# Patient Record
Sex: FEMALE | URBAN - METROPOLITAN AREA
[De-identification: names, ages, dates, MRNs, and addresses within clinical notes are randomized per-mention and may not be internally consistent; named-entity substitution may affect disease eponyms.]

---

## 2021-09-13 ENCOUNTER — LAB VISIT (OUTPATIENT)
Dept: LAB | Facility: OTHER | Age: 86
End: 2021-09-13
Payer: OTHER GOVERNMENT

## 2021-09-13 DIAGNOSIS — Z20.822 ENCOUNTER FOR LABORATORY TESTING FOR COVID-19 VIRUS: ICD-10-CM

## 2021-09-13 PROCEDURE — U0003 INFECTIOUS AGENT DETECTION BY NUCLEIC ACID (DNA OR RNA); SEVERE ACUTE RESPIRATORY SYNDROME CORONAVIRUS 2 (SARS-COV-2) (CORONAVIRUS DISEASE [COVID-19]), AMPLIFIED PROBE TECHNIQUE, MAKING USE OF HIGH THROUGHPUT TECHNOLOGIES AS DESCRIBED BY CMS-2020-01-R: HCPCS | Performed by: NURSE PRACTITIONER

## 2021-09-14 LAB
SARS-COV-2 RNA RESP QL NAA+PROBE: NOT DETECTED
SARS-COV-2- CYCLE NUMBER: NORMAL

## 2021-09-20 ENCOUNTER — LAB VISIT (OUTPATIENT)
Dept: LAB | Facility: OTHER | Age: 86
End: 2021-09-20
Payer: OTHER GOVERNMENT

## 2021-09-20 DIAGNOSIS — Z20.822 ENCOUNTER FOR LABORATORY TESTING FOR COVID-19 VIRUS: ICD-10-CM

## 2021-09-20 PROCEDURE — U0003 INFECTIOUS AGENT DETECTION BY NUCLEIC ACID (DNA OR RNA); SEVERE ACUTE RESPIRATORY SYNDROME CORONAVIRUS 2 (SARS-COV-2) (CORONAVIRUS DISEASE [COVID-19]), AMPLIFIED PROBE TECHNIQUE, MAKING USE OF HIGH THROUGHPUT TECHNOLOGIES AS DESCRIBED BY CMS-2020-01-R: HCPCS | Performed by: NURSE PRACTITIONER

## 2021-09-21 LAB
SARS-COV-2 RNA RESP QL NAA+PROBE: NOT DETECTED
SARS-COV-2- CYCLE NUMBER: NORMAL

## 2021-09-27 ENCOUNTER — LAB VISIT (OUTPATIENT)
Dept: LAB | Facility: OTHER | Age: 86
End: 2021-09-27
Payer: OTHER GOVERNMENT

## 2021-09-27 DIAGNOSIS — Z20.822 ENCOUNTER FOR LABORATORY TESTING FOR COVID-19 VIRUS: ICD-10-CM

## 2021-09-27 PROCEDURE — U0003 INFECTIOUS AGENT DETECTION BY NUCLEIC ACID (DNA OR RNA); SEVERE ACUTE RESPIRATORY SYNDROME CORONAVIRUS 2 (SARS-COV-2) (CORONAVIRUS DISEASE [COVID-19]), AMPLIFIED PROBE TECHNIQUE, MAKING USE OF HIGH THROUGHPUT TECHNOLOGIES AS DESCRIBED BY CMS-2020-01-R: HCPCS | Performed by: NURSE PRACTITIONER

## 2021-09-28 LAB
SARS-COV-2 RNA RESP QL NAA+PROBE: NOT DETECTED
SARS-COV-2- CYCLE NUMBER: NORMAL

## 2021-10-04 ENCOUNTER — LAB VISIT (OUTPATIENT)
Dept: LAB | Facility: OTHER | Age: 86
End: 2021-10-04
Payer: OTHER GOVERNMENT

## 2021-10-04 DIAGNOSIS — Z20.822 ENCOUNTER FOR LABORATORY TESTING FOR COVID-19 VIRUS: ICD-10-CM

## 2021-10-04 PROCEDURE — U0003 INFECTIOUS AGENT DETECTION BY NUCLEIC ACID (DNA OR RNA); SEVERE ACUTE RESPIRATORY SYNDROME CORONAVIRUS 2 (SARS-COV-2) (CORONAVIRUS DISEASE [COVID-19]), AMPLIFIED PROBE TECHNIQUE, MAKING USE OF HIGH THROUGHPUT TECHNOLOGIES AS DESCRIBED BY CMS-2020-01-R: HCPCS | Performed by: NURSE PRACTITIONER

## 2021-10-05 LAB
SARS-COV-2 RNA RESP QL NAA+PROBE: NOT DETECTED
SARS-COV-2- CYCLE NUMBER: NORMAL

## 2021-10-11 ENCOUNTER — LAB VISIT (OUTPATIENT)
Dept: LAB | Facility: OTHER | Age: 86
End: 2021-10-11
Payer: OTHER GOVERNMENT

## 2021-10-11 DIAGNOSIS — Z20.822 ENCOUNTER FOR LABORATORY TESTING FOR COVID-19 VIRUS: ICD-10-CM

## 2021-10-11 PROCEDURE — U0003 INFECTIOUS AGENT DETECTION BY NUCLEIC ACID (DNA OR RNA); SEVERE ACUTE RESPIRATORY SYNDROME CORONAVIRUS 2 (SARS-COV-2) (CORONAVIRUS DISEASE [COVID-19]), AMPLIFIED PROBE TECHNIQUE, MAKING USE OF HIGH THROUGHPUT TECHNOLOGIES AS DESCRIBED BY CMS-2020-01-R: HCPCS | Performed by: NURSE PRACTITIONER

## 2021-10-12 LAB
SARS-COV-2 RNA RESP QL NAA+PROBE: NOT DETECTED
SARS-COV-2- CYCLE NUMBER: NORMAL

## 2021-10-18 ENCOUNTER — LAB VISIT (OUTPATIENT)
Dept: LAB | Facility: OTHER | Age: 86
End: 2021-10-18
Payer: OTHER GOVERNMENT

## 2021-10-18 DIAGNOSIS — Z20.822 ENCOUNTER FOR LABORATORY TESTING FOR COVID-19 VIRUS: ICD-10-CM

## 2021-10-18 PROCEDURE — U0003 INFECTIOUS AGENT DETECTION BY NUCLEIC ACID (DNA OR RNA); SEVERE ACUTE RESPIRATORY SYNDROME CORONAVIRUS 2 (SARS-COV-2) (CORONAVIRUS DISEASE [COVID-19]), AMPLIFIED PROBE TECHNIQUE, MAKING USE OF HIGH THROUGHPUT TECHNOLOGIES AS DESCRIBED BY CMS-2020-01-R: HCPCS | Performed by: NURSE PRACTITIONER

## 2021-10-19 LAB
SARS-COV-2 RNA RESP QL NAA+PROBE: NOT DETECTED
SARS-COV-2- CYCLE NUMBER: NORMAL

## 2021-10-25 ENCOUNTER — LAB VISIT (OUTPATIENT)
Dept: LAB | Facility: OTHER | Age: 86
End: 2021-10-25
Payer: OTHER GOVERNMENT

## 2021-10-25 DIAGNOSIS — Z20.822 ENCOUNTER FOR LABORATORY TESTING FOR COVID-19 VIRUS: ICD-10-CM

## 2021-10-25 PROCEDURE — U0003 INFECTIOUS AGENT DETECTION BY NUCLEIC ACID (DNA OR RNA); SEVERE ACUTE RESPIRATORY SYNDROME CORONAVIRUS 2 (SARS-COV-2) (CORONAVIRUS DISEASE [COVID-19]), AMPLIFIED PROBE TECHNIQUE, MAKING USE OF HIGH THROUGHPUT TECHNOLOGIES AS DESCRIBED BY CMS-2020-01-R: HCPCS | Performed by: NURSE PRACTITIONER

## 2021-10-26 LAB
SARS-COV-2 RNA RESP QL NAA+PROBE: NOT DETECTED
SARS-COV-2- CYCLE NUMBER: NORMAL

## 2021-11-08 ENCOUNTER — LAB VISIT (OUTPATIENT)
Dept: LAB | Facility: OTHER | Age: 86
End: 2021-11-08
Payer: OTHER GOVERNMENT

## 2021-11-08 DIAGNOSIS — Z20.822 ENCOUNTER FOR LABORATORY TESTING FOR COVID-19 VIRUS: ICD-10-CM

## 2021-11-08 PROCEDURE — U0003 INFECTIOUS AGENT DETECTION BY NUCLEIC ACID (DNA OR RNA); SEVERE ACUTE RESPIRATORY SYNDROME CORONAVIRUS 2 (SARS-COV-2) (CORONAVIRUS DISEASE [COVID-19]), AMPLIFIED PROBE TECHNIQUE, MAKING USE OF HIGH THROUGHPUT TECHNOLOGIES AS DESCRIBED BY CMS-2020-01-R: HCPCS | Performed by: NURSE PRACTITIONER

## 2021-11-09 LAB
SARS-COV-2 RNA RESP QL NAA+PROBE: NOT DETECTED
SARS-COV-2- CYCLE NUMBER: NORMAL

## 2021-11-15 ENCOUNTER — LAB VISIT (OUTPATIENT)
Dept: LAB | Facility: OTHER | Age: 86
End: 2021-11-15
Payer: OTHER GOVERNMENT

## 2021-11-15 DIAGNOSIS — Z20.822 ENCOUNTER FOR LABORATORY TESTING FOR COVID-19 VIRUS: ICD-10-CM

## 2021-11-15 PROCEDURE — U0003 INFECTIOUS AGENT DETECTION BY NUCLEIC ACID (DNA OR RNA); SEVERE ACUTE RESPIRATORY SYNDROME CORONAVIRUS 2 (SARS-COV-2) (CORONAVIRUS DISEASE [COVID-19]), AMPLIFIED PROBE TECHNIQUE, MAKING USE OF HIGH THROUGHPUT TECHNOLOGIES AS DESCRIBED BY CMS-2020-01-R: HCPCS | Performed by: NURSE PRACTITIONER

## 2021-11-16 LAB
SARS-COV-2 RNA RESP QL NAA+PROBE: NOT DETECTED
SARS-COV-2- CYCLE NUMBER: NORMAL

## 2021-11-22 ENCOUNTER — LAB VISIT (OUTPATIENT)
Dept: LAB | Facility: OTHER | Age: 86
End: 2021-11-22
Payer: OTHER GOVERNMENT

## 2021-11-22 DIAGNOSIS — Z20.822 ENCOUNTER FOR LABORATORY TESTING FOR COVID-19 VIRUS: ICD-10-CM

## 2021-11-22 PROCEDURE — U0003 INFECTIOUS AGENT DETECTION BY NUCLEIC ACID (DNA OR RNA); SEVERE ACUTE RESPIRATORY SYNDROME CORONAVIRUS 2 (SARS-COV-2) (CORONAVIRUS DISEASE [COVID-19]), AMPLIFIED PROBE TECHNIQUE, MAKING USE OF HIGH THROUGHPUT TECHNOLOGIES AS DESCRIBED BY CMS-2020-01-R: HCPCS | Performed by: NURSE PRACTITIONER

## 2021-11-23 ENCOUNTER — OFFICE VISIT (OUTPATIENT)
Dept: PRIMARY CARE CLINIC | Facility: CLINIC | Age: 86
End: 2021-11-23
Payer: MEDICARE

## 2021-11-23 VITALS
RESPIRATION RATE: 16 BRPM | HEIGHT: 60 IN | WEIGHT: 102.19 LBS | HEART RATE: 72 BPM | OXYGEN SATURATION: 96 % | SYSTOLIC BLOOD PRESSURE: 128 MMHG | DIASTOLIC BLOOD PRESSURE: 52 MMHG | BODY MASS INDEX: 20.06 KG/M2 | TEMPERATURE: 99 F

## 2021-11-23 DIAGNOSIS — Z11.59 NEED FOR HEPATITIS C SCREENING TEST: ICD-10-CM

## 2021-11-23 DIAGNOSIS — Z76.89 ENCOUNTER TO ESTABLISH CARE: ICD-10-CM

## 2021-11-23 DIAGNOSIS — Z11.4 ENCOUNTER FOR SCREENING FOR HIV: ICD-10-CM

## 2021-11-23 DIAGNOSIS — Z87.898 HISTORY OF PREDIABETES: ICD-10-CM

## 2021-11-23 DIAGNOSIS — G47.00 INSOMNIA, UNSPECIFIED TYPE: ICD-10-CM

## 2021-11-23 DIAGNOSIS — E16.1 OTHER HYPOGLYCEMIA: ICD-10-CM

## 2021-11-23 DIAGNOSIS — E46 UNSPECIFIED PROTEIN-CALORIE MALNUTRITION: ICD-10-CM

## 2021-11-23 DIAGNOSIS — K64.9 HEMORRHOIDS, UNSPECIFIED HEMORRHOID TYPE: Chronic | ICD-10-CM

## 2021-11-23 DIAGNOSIS — F41.1 GAD (GENERALIZED ANXIETY DISORDER): Chronic | ICD-10-CM

## 2021-11-23 DIAGNOSIS — I10 HYPERTENSION, UNSPECIFIED TYPE: Primary | ICD-10-CM

## 2021-11-23 DIAGNOSIS — Z13.6 SCREENING FOR CARDIOVASCULAR CONDITION: ICD-10-CM

## 2021-11-23 DIAGNOSIS — N20.0 KIDNEY STONES: ICD-10-CM

## 2021-11-23 LAB
SARS-COV-2 RNA RESP QL NAA+PROBE: NOT DETECTED
SARS-COV-2- CYCLE NUMBER: NORMAL

## 2021-11-23 PROCEDURE — 99204 PR OFFICE/OUTPT VISIT, NEW, LEVL IV, 45-59 MIN: ICD-10-PCS | Mod: S$PBB,,, | Performed by: STUDENT IN AN ORGANIZED HEALTH CARE EDUCATION/TRAINING PROGRAM

## 2021-11-23 PROCEDURE — 99204 OFFICE O/P NEW MOD 45 MIN: CPT | Mod: S$PBB,,, | Performed by: STUDENT IN AN ORGANIZED HEALTH CARE EDUCATION/TRAINING PROGRAM

## 2021-11-23 PROCEDURE — 93010 EKG 12-LEAD: ICD-10-PCS | Mod: S$PBB,,, | Performed by: INTERNAL MEDICINE

## 2021-11-23 PROCEDURE — 93010 ELECTROCARDIOGRAM REPORT: CPT | Mod: S$PBB,,, | Performed by: INTERNAL MEDICINE

## 2021-11-23 PROCEDURE — 99999 PR PBB SHADOW E&M-NEW PATIENT-LVL IV: CPT | Mod: PBBFAC,,, | Performed by: STUDENT IN AN ORGANIZED HEALTH CARE EDUCATION/TRAINING PROGRAM

## 2021-11-23 PROCEDURE — 93005 ELECTROCARDIOGRAM TRACING: CPT | Mod: PBBFAC,PN | Performed by: INTERNAL MEDICINE

## 2021-11-23 PROCEDURE — 99999 PR PBB SHADOW E&M-NEW PATIENT-LVL IV: ICD-10-PCS | Mod: PBBFAC,,, | Performed by: STUDENT IN AN ORGANIZED HEALTH CARE EDUCATION/TRAINING PROGRAM

## 2021-11-23 PROCEDURE — 99204 OFFICE O/P NEW MOD 45 MIN: CPT | Mod: PBBFAC,PN | Performed by: STUDENT IN AN ORGANIZED HEALTH CARE EDUCATION/TRAINING PROGRAM

## 2021-11-23 RX ORDER — IRBESARTAN 300 MG/1
1 TABLET ORAL EVERY MORNING
COMMUNITY
Start: 2021-10-05 | End: 2022-04-22 | Stop reason: SDUPTHER

## 2021-11-23 RX ORDER — NIFEDIPINE 30 MG/1
1 TABLET, EXTENDED RELEASE ORAL DAILY
COMMUNITY
Start: 2021-10-05 | End: 2021-11-23

## 2021-11-23 RX ORDER — TRAZODONE HYDROCHLORIDE 50 MG/1
50 TABLET ORAL NIGHTLY
Qty: 30 TABLET | Refills: 11 | Status: SHIPPED | OUTPATIENT
Start: 2021-11-23 | End: 2022-05-09 | Stop reason: SDUPTHER

## 2021-11-23 RX ORDER — AMLODIPINE BESYLATE 5 MG/1
5 TABLET ORAL DAILY
Qty: 90 TABLET | Refills: 3 | Status: SHIPPED | OUTPATIENT
Start: 2021-11-23 | End: 2022-05-09 | Stop reason: SDUPTHER

## 2021-11-23 RX ORDER — ACETAMINOPHEN 500 MG
5 TABLET ORAL NIGHTLY
Qty: 90 TABLET | Refills: 3 | Status: SHIPPED | OUTPATIENT
Start: 2021-11-23 | End: 2022-05-09

## 2021-11-23 RX ORDER — ALPRAZOLAM 0.5 MG/1
1 TABLET ORAL NIGHTLY
COMMUNITY
Start: 2021-11-17 | End: 2022-05-09

## 2021-11-23 RX ORDER — IBUPROFEN 200 MG
200 TABLET ORAL EVERY 6 HOURS PRN
COMMUNITY

## 2021-11-23 RX ORDER — POLYETHYLENE GLYCOL 3350 17 G/17G
17 POWDER, FOR SOLUTION ORAL DAILY
COMMUNITY

## 2021-11-23 RX ORDER — SENNOSIDES 25 MG/1
TABLET, FILM COATED ORAL
COMMUNITY
End: 2022-10-27

## 2021-11-29 ENCOUNTER — LAB VISIT (OUTPATIENT)
Dept: LAB | Facility: OTHER | Age: 86
End: 2021-11-29
Payer: OTHER GOVERNMENT

## 2021-11-29 DIAGNOSIS — Z20.822 ENCOUNTER FOR LABORATORY TESTING FOR COVID-19 VIRUS: ICD-10-CM

## 2021-11-29 PROCEDURE — U0003 INFECTIOUS AGENT DETECTION BY NUCLEIC ACID (DNA OR RNA); SEVERE ACUTE RESPIRATORY SYNDROME CORONAVIRUS 2 (SARS-COV-2) (CORONAVIRUS DISEASE [COVID-19]), AMPLIFIED PROBE TECHNIQUE, MAKING USE OF HIGH THROUGHPUT TECHNOLOGIES AS DESCRIBED BY CMS-2020-01-R: HCPCS | Performed by: NURSE PRACTITIONER

## 2021-11-30 LAB
SARS-COV-2 RNA RESP QL NAA+PROBE: NOT DETECTED
SARS-COV-2- CYCLE NUMBER: NORMAL

## 2021-12-06 ENCOUNTER — LAB VISIT (OUTPATIENT)
Dept: LAB | Facility: OTHER | Age: 86
End: 2021-12-06
Payer: OTHER GOVERNMENT

## 2021-12-06 DIAGNOSIS — Z20.822 ENCOUNTER FOR LABORATORY TESTING FOR COVID-19 VIRUS: ICD-10-CM

## 2021-12-06 PROCEDURE — U0003 INFECTIOUS AGENT DETECTION BY NUCLEIC ACID (DNA OR RNA); SEVERE ACUTE RESPIRATORY SYNDROME CORONAVIRUS 2 (SARS-COV-2) (CORONAVIRUS DISEASE [COVID-19]), AMPLIFIED PROBE TECHNIQUE, MAKING USE OF HIGH THROUGHPUT TECHNOLOGIES AS DESCRIBED BY CMS-2020-01-R: HCPCS | Performed by: NURSE PRACTITIONER

## 2021-12-07 LAB
SARS-COV-2 RNA RESP QL NAA+PROBE: NOT DETECTED
SARS-COV-2- CYCLE NUMBER: NORMAL

## 2021-12-13 ENCOUNTER — LAB VISIT (OUTPATIENT)
Dept: LAB | Facility: OTHER | Age: 86
End: 2021-12-13
Payer: OTHER GOVERNMENT

## 2021-12-13 DIAGNOSIS — Z20.822 ENCOUNTER FOR LABORATORY TESTING FOR COVID-19 VIRUS: ICD-10-CM

## 2021-12-13 PROCEDURE — U0003 INFECTIOUS AGENT DETECTION BY NUCLEIC ACID (DNA OR RNA); SEVERE ACUTE RESPIRATORY SYNDROME CORONAVIRUS 2 (SARS-COV-2) (CORONAVIRUS DISEASE [COVID-19]), AMPLIFIED PROBE TECHNIQUE, MAKING USE OF HIGH THROUGHPUT TECHNOLOGIES AS DESCRIBED BY CMS-2020-01-R: HCPCS | Performed by: NURSE PRACTITIONER

## 2021-12-14 LAB
SARS-COV-2 RNA RESP QL NAA+PROBE: NOT DETECTED
SARS-COV-2- CYCLE NUMBER: NORMAL

## 2021-12-20 ENCOUNTER — LAB VISIT (OUTPATIENT)
Dept: LAB | Facility: OTHER | Age: 86
End: 2021-12-20
Payer: OTHER GOVERNMENT

## 2021-12-20 DIAGNOSIS — Z20.822 ENCOUNTER FOR LABORATORY TESTING FOR COVID-19 VIRUS: ICD-10-CM

## 2021-12-20 PROCEDURE — U0003 INFECTIOUS AGENT DETECTION BY NUCLEIC ACID (DNA OR RNA); SEVERE ACUTE RESPIRATORY SYNDROME CORONAVIRUS 2 (SARS-COV-2) (CORONAVIRUS DISEASE [COVID-19]), AMPLIFIED PROBE TECHNIQUE, MAKING USE OF HIGH THROUGHPUT TECHNOLOGIES AS DESCRIBED BY CMS-2020-01-R: HCPCS | Performed by: NURSE PRACTITIONER

## 2021-12-21 LAB
SARS-COV-2 RNA RESP QL NAA+PROBE: NOT DETECTED
SARS-COV-2- CYCLE NUMBER: NORMAL

## 2021-12-27 ENCOUNTER — LAB VISIT (OUTPATIENT)
Dept: LAB | Facility: OTHER | Age: 86
End: 2021-12-27
Payer: OTHER GOVERNMENT

## 2021-12-27 DIAGNOSIS — Z20.822 ENCOUNTER FOR LABORATORY TESTING FOR COVID-19 VIRUS: ICD-10-CM

## 2021-12-27 PROCEDURE — U0003 INFECTIOUS AGENT DETECTION BY NUCLEIC ACID (DNA OR RNA); SEVERE ACUTE RESPIRATORY SYNDROME CORONAVIRUS 2 (SARS-COV-2) (CORONAVIRUS DISEASE [COVID-19]), AMPLIFIED PROBE TECHNIQUE, MAKING USE OF HIGH THROUGHPUT TECHNOLOGIES AS DESCRIBED BY CMS-2020-01-R: HCPCS | Performed by: NURSE PRACTITIONER

## 2021-12-28 LAB
SARS-COV-2 RNA RESP QL NAA+PROBE: NOT DETECTED
SARS-COV-2- CYCLE NUMBER: NORMAL

## 2022-01-07 ENCOUNTER — TELEPHONE (OUTPATIENT)
Dept: PRIMARY CARE CLINIC | Facility: CLINIC | Age: 87
End: 2022-01-07
Payer: MEDICARE

## 2022-01-07 NOTE — TELEPHONE ENCOUNTER
----- Message from Kimmy Fitch sent at 1/7/2022  2:49 PM CST -----  Contact: Vicki Stanford [phone]  Checking to see if you received the faxed paperwork that was sent over for this patient.    Please call and advise.    Thank You

## 2022-01-24 ENCOUNTER — TELEPHONE (OUTPATIENT)
Dept: PRIMARY CARE CLINIC | Facility: CLINIC | Age: 87
End: 2022-01-24
Payer: MEDICARE

## 2022-01-24 NOTE — TELEPHONE ENCOUNTER
----- Message from Flora Galloway sent at 1/24/2022  8:22 AM CST -----  Contact: [phone]  Terri Stanford [facility] calling in regards to paperwork that was faxed over on 1/4 for this patient they are calling to get an update.Please advise

## 2022-04-05 ENCOUNTER — TELEPHONE (OUTPATIENT)
Dept: INTERNAL MEDICINE | Facility: CLINIC | Age: 87
End: 2022-04-05
Payer: MEDICAID

## 2022-04-05 NOTE — TELEPHONE ENCOUNTER
----- Message from Jacobo Sandoval MD sent at 4/4/2022 12:36 PM CDT -----  Regarding: Appt reschedule  I will have a meeting on 4/28 at 11 AM. Please reschedule this pt to 4/28 at 1 pm or 4/27 at 11 AM. Please send reply once rescheduled.

## 2022-04-05 NOTE — TELEPHONE ENCOUNTER
Called pt to assist in rescheduling per Dr Sandoval's last note- please assist patient in rescheduling her appt with Dr Sandoval upon call back, thank you!

## 2022-04-22 ENCOUNTER — PATIENT MESSAGE (OUTPATIENT)
Dept: INTERNAL MEDICINE | Facility: CLINIC | Age: 87
End: 2022-04-22
Payer: MEDICAID

## 2022-04-22 DIAGNOSIS — I10 HYPERTENSION, UNSPECIFIED TYPE: Primary | ICD-10-CM

## 2022-04-22 RX ORDER — IRBESARTAN 300 MG/1
300 TABLET ORAL EVERY MORNING
Qty: 30 TABLET | Refills: 0 | Status: SHIPPED | OUTPATIENT
Start: 2022-04-22 | End: 2022-04-25 | Stop reason: SDUPTHER

## 2022-04-22 NOTE — TELEPHONE ENCOUNTER
No new care gaps identified.  Powered by [employer]. Reference number: [id number].   4/22/2022 1:28:56 PM CDT

## 2022-05-04 ENCOUNTER — TELEPHONE (OUTPATIENT)
Dept: INTERNAL MEDICINE | Facility: CLINIC | Age: 87
End: 2022-05-04
Payer: MEDICAID

## 2022-05-04 NOTE — TELEPHONE ENCOUNTER
----- Message from Jana Malhotra sent at 5/3/2022  5:01 PM CDT -----  Regarding: Scheduling  Please get pt in ASAP as she is 89

## 2022-05-04 NOTE — TELEPHONE ENCOUNTER
Tried to reach pt but line was busy- Pt has appt with Dee Cope for emergent issues coming up in the next week- will schedule w Dr Sandoval/other provider to establish care at that point when pt is seen

## 2022-05-09 ENCOUNTER — OFFICE VISIT (OUTPATIENT)
Dept: INTERNAL MEDICINE | Facility: CLINIC | Age: 87
End: 2022-05-09
Payer: MEDICARE

## 2022-05-09 VITALS
BODY MASS INDEX: 22.11 KG/M2 | DIASTOLIC BLOOD PRESSURE: 60 MMHG | WEIGHT: 109.69 LBS | HEART RATE: 72 BPM | OXYGEN SATURATION: 98 % | HEIGHT: 59 IN | SYSTOLIC BLOOD PRESSURE: 140 MMHG

## 2022-05-09 DIAGNOSIS — F41.1 GAD (GENERALIZED ANXIETY DISORDER): Primary | Chronic | ICD-10-CM

## 2022-05-09 DIAGNOSIS — I10 HYPERTENSION, UNSPECIFIED TYPE: ICD-10-CM

## 2022-05-09 DIAGNOSIS — G47.00 INSOMNIA, UNSPECIFIED TYPE: ICD-10-CM

## 2022-05-09 DIAGNOSIS — K64.9 HEMORRHOIDS, UNSPECIFIED HEMORRHOID TYPE: Chronic | ICD-10-CM

## 2022-05-09 PROCEDURE — 99214 PR OFFICE/OUTPT VISIT, EST, LEVL IV, 30-39 MIN: ICD-10-PCS | Mod: S$PBB,,, | Performed by: PHYSICIAN ASSISTANT

## 2022-05-09 PROCEDURE — 99999 PR PBB SHADOW E&M-EST. PATIENT-LVL III: ICD-10-PCS | Mod: PBBFAC,,, | Performed by: PHYSICIAN ASSISTANT

## 2022-05-09 PROCEDURE — 99214 OFFICE O/P EST MOD 30 MIN: CPT | Mod: S$PBB,,, | Performed by: PHYSICIAN ASSISTANT

## 2022-05-09 PROCEDURE — 99999 PR PBB SHADOW E&M-EST. PATIENT-LVL III: CPT | Mod: PBBFAC,,, | Performed by: PHYSICIAN ASSISTANT

## 2022-05-09 PROCEDURE — 99213 OFFICE O/P EST LOW 20 MIN: CPT | Mod: PBBFAC | Performed by: PHYSICIAN ASSISTANT

## 2022-05-09 RX ORDER — IRBESARTAN 300 MG/1
300 TABLET ORAL EVERY MORNING
Qty: 90 TABLET | Refills: 1 | Status: SHIPPED | OUTPATIENT
Start: 2022-05-09 | End: 2022-08-22 | Stop reason: ALTCHOICE

## 2022-05-09 RX ORDER — AMLODIPINE BESYLATE 5 MG/1
5 TABLET ORAL DAILY
Qty: 90 TABLET | Refills: 1 | Status: SHIPPED | OUTPATIENT
Start: 2022-05-09 | End: 2022-11-22 | Stop reason: SDUPTHER

## 2022-05-09 RX ORDER — TRAZODONE HYDROCHLORIDE 50 MG/1
50 TABLET ORAL NIGHTLY
Qty: 90 TABLET | Refills: 1 | Status: SHIPPED | OUTPATIENT
Start: 2022-05-09 | End: 2022-11-22 | Stop reason: SDUPTHER

## 2022-05-09 NOTE — PROGRESS NOTES
Subjective:       Patient ID: Aviva Kaur is a 89 y.o. female.        Chief Complaint: Medication Refill    Aviva Kaur is an established patient of Chandana Corona MD here today for follow up visit.    Her daughter, Juliette, and son, Rell, are both here today.  Her daughter is a nurse.    She follows with Dr. Corona but has moved to [facility] so this location is now closer.  She will be establishing care with Dr. Sandoval but needs refills in the interim.    Insomnia - previously on xanax but weaned and now controlled with trazodone    HTN - tx with amlodipine 5 mg and irbesartan 300 mg, BP at home 120-130 systolic, slightly higher in clinic today but gets anxious    Hemorrhoids - tx with preparation H OTC prn         Review of Systems   Constitutional: Negative for chills, diaphoresis, fatigue and fever.   HENT: Negative for congestion and sore throat.    Eyes: Negative for visual disturbance.   Respiratory: Negative for cough, chest tightness and shortness of breath.    Cardiovascular: Negative for chest pain, palpitations and leg swelling.   Gastrointestinal: Negative for abdominal pain, blood in stool, constipation, diarrhea, nausea and vomiting.   Genitourinary: Negative for dysuria, frequency, hematuria and urgency.   Musculoskeletal: Negative for arthralgias and back pain.   Skin: Negative for rash.   Neurological: Negative for dizziness, syncope, weakness and headaches.   Psychiatric/Behavioral: Negative for dysphoric mood and sleep disturbance. The patient is not nervous/anxious.        Objective:      Physical Exam  Vitals and nursing note reviewed.   Constitutional:       Appearance: Normal appearance. She is well-developed.   HENT:      Head: Normocephalic.      Right Ear: External ear normal.      Left Ear: External ear normal.   Eyes:      Pupils: Pupils are equal, round, and reactive to light.   Cardiovascular:      Rate and Rhythm: Normal rate and regular rhythm.      Heart  "sounds: Normal heart sounds. No murmur heard.    No friction rub. No gallop.   Pulmonary:      Effort: Pulmonary effort is normal. No respiratory distress.      Breath sounds: Normal breath sounds.   Abdominal:      Palpations: Abdomen is soft.      Tenderness: There is no abdominal tenderness.   Skin:     General: Skin is warm and dry.   Neurological:      Mental Status: She is alert.         Assessment:       1. MICHELLE (generalized anxiety disorder)    2. Insomnia, unspecified type    3. Hypertension, unspecified type    4. Hemorrhoids, unspecified hemorrhoid type        Plan:       Aviva was seen today for medication refill.    Diagnoses and all orders for this visit:    MICHELLE (generalized anxiety disorder) - stable, controlled, gets anxious in clinic visits    Insomnia, unspecified type  -     traZODone (DESYREL) 50 MG tablet; Take 1 tablet (50 mg total) by mouth every evening.    Hypertension, unspecified type  -     irbesartan (AVAPRO) 300 MG tablet; Take 1 tablet (300 mg total) by mouth every morning.  -     amLODIPine (NORVASC) 5 MG tablet; Take 1 tablet (5 mg total) by mouth once daily.    Hemorrhoids, unspecified hemorrhoid type - tx with preparation H prn    Refill medications as above  BP initially 152/68, improved to 140/60, home readings even better at 120-130, monitor  Trazodone working well for sleep - previously on xanax but weaned, doing well on current regimen  Lab work is up-to-date and due 12/2022    Pt has been given instructions populated from [employer] database and has verbalized understanding of the after visit summary and information contained wherein.    Follow up with a primary care provider. May go to ER for acute shortness of breath, lightheadedness, fever, or any other emergent complaints or changes in condition.    "This note will be shared with the patient"    Future Appointments   Date Time Provider Department Center   6/14/2022 10:00 AM Jacobo Sandoval MD [facility]         "

## 2022-06-14 ENCOUNTER — OFFICE VISIT (OUTPATIENT)
Dept: INTERNAL MEDICINE | Facility: CLINIC | Age: 87
End: 2022-06-14
Payer: MEDICARE

## 2022-06-14 ENCOUNTER — LAB VISIT (OUTPATIENT)
Dept: LAB | Facility: HOSPITAL | Age: 87
End: 2022-06-14
Payer: MEDICARE

## 2022-06-14 VITALS
HEART RATE: 76 BPM | SYSTOLIC BLOOD PRESSURE: 132 MMHG | BODY MASS INDEX: 21.77 KG/M2 | DIASTOLIC BLOOD PRESSURE: 70 MMHG | HEIGHT: 59 IN | WEIGHT: 108 LBS | OXYGEN SATURATION: 97 %

## 2022-06-14 DIAGNOSIS — Z76.89 ENCOUNTER TO ESTABLISH CARE WITH NEW DOCTOR: Primary | ICD-10-CM

## 2022-06-14 DIAGNOSIS — N20.0 KIDNEY STONES: ICD-10-CM

## 2022-06-14 DIAGNOSIS — R74.8 ABNORMAL LEVELS OF OTHER SERUM ENZYMES: ICD-10-CM

## 2022-06-14 DIAGNOSIS — R73.03 PREDIABETES: ICD-10-CM

## 2022-06-14 DIAGNOSIS — H61.23 BILATERAL IMPACTED CERUMEN: ICD-10-CM

## 2022-06-14 DIAGNOSIS — I10 PRIMARY HYPERTENSION: ICD-10-CM

## 2022-06-14 DIAGNOSIS — F41.1 GAD (GENERALIZED ANXIETY DISORDER): ICD-10-CM

## 2022-06-14 DIAGNOSIS — G47.00 INSOMNIA, UNSPECIFIED TYPE: ICD-10-CM

## 2022-06-14 DIAGNOSIS — R41.3 MEMORY CHANGE: ICD-10-CM

## 2022-06-14 LAB
FOLATE SERPL-MCNC: 14 NG/ML (ref 4–24)
VIT B12 SERPL-MCNC: 355 PG/ML (ref 210–950)

## 2022-06-14 PROCEDURE — 82746 ASSAY OF FOLIC ACID SERUM: CPT | Performed by: INTERNAL MEDICINE

## 2022-06-14 PROCEDURE — 82607 VITAMIN B-12: CPT | Performed by: INTERNAL MEDICINE

## 2022-06-14 PROCEDURE — 36415 COLL VENOUS BLD VENIPUNCTURE: CPT | Performed by: INTERNAL MEDICINE

## 2022-06-14 PROCEDURE — 84207 ASSAY OF VITAMIN B-6: CPT | Performed by: INTERNAL MEDICINE

## 2022-06-14 PROCEDURE — 99999 PR PBB SHADOW E&M-EST. PATIENT-LVL III: CPT | Mod: PBBFAC,,, | Performed by: INTERNAL MEDICINE

## 2022-06-14 PROCEDURE — 99213 OFFICE O/P EST LOW 20 MIN: CPT | Mod: PBBFAC | Performed by: INTERNAL MEDICINE

## 2022-06-14 PROCEDURE — 84425 ASSAY OF VITAMIN B-1: CPT | Performed by: INTERNAL MEDICINE

## 2022-06-14 PROCEDURE — 99999 PR PBB SHADOW E&M-EST. PATIENT-LVL III: ICD-10-PCS | Mod: PBBFAC,,, | Performed by: INTERNAL MEDICINE

## 2022-06-14 PROCEDURE — 99214 OFFICE O/P EST MOD 30 MIN: CPT | Mod: S$PBB,,, | Performed by: INTERNAL MEDICINE

## 2022-06-14 PROCEDURE — 99214 PR OFFICE/OUTPT VISIT, EST, LEVL IV, 30-39 MIN: ICD-10-PCS | Mod: S$PBB,,, | Performed by: INTERNAL MEDICINE

## 2022-06-20 LAB — PYRIDOXAL SERPL-MCNC: 9 UG/L (ref 5–50)

## 2022-07-07 ENCOUNTER — TELEPHONE (OUTPATIENT)
Dept: INTERNAL MEDICINE | Facility: CLINIC | Age: 87
End: 2022-07-07
Payer: MEDICARE

## 2022-08-22 ENCOUNTER — OFFICE VISIT (OUTPATIENT)
Dept: INTERNAL MEDICINE | Facility: CLINIC | Age: 87
End: 2022-08-22
Payer: MEDICARE

## 2022-08-22 VITALS
HEIGHT: 60 IN | BODY MASS INDEX: 22.85 KG/M2 | SYSTOLIC BLOOD PRESSURE: 120 MMHG | RESPIRATION RATE: 98 BRPM | DIASTOLIC BLOOD PRESSURE: 78 MMHG | WEIGHT: 116.38 LBS | HEART RATE: 89 BPM

## 2022-08-22 DIAGNOSIS — R13.10 PILL DYSPHAGIA: ICD-10-CM

## 2022-08-22 DIAGNOSIS — G47.00 INSOMNIA, UNSPECIFIED TYPE: ICD-10-CM

## 2022-08-22 DIAGNOSIS — R41.3 MEMORY CHANGE: Primary | ICD-10-CM

## 2022-08-22 DIAGNOSIS — I10 PRIMARY HYPERTENSION: ICD-10-CM

## 2022-08-22 PROCEDURE — 99999 PR PBB SHADOW E&M-EST. PATIENT-LVL IV: ICD-10-PCS | Mod: PBBFAC,,, | Performed by: INTERNAL MEDICINE

## 2022-08-22 PROCEDURE — 99214 OFFICE O/P EST MOD 30 MIN: CPT | Mod: S$PBB,,, | Performed by: INTERNAL MEDICINE

## 2022-08-22 PROCEDURE — 99214 PR OFFICE/OUTPT VISIT, EST, LEVL IV, 30-39 MIN: ICD-10-PCS | Mod: S$PBB,,, | Performed by: INTERNAL MEDICINE

## 2022-08-22 PROCEDURE — 99214 OFFICE O/P EST MOD 30 MIN: CPT | Mod: PBBFAC | Performed by: INTERNAL MEDICINE

## 2022-08-22 PROCEDURE — 99999 PR PBB SHADOW E&M-EST. PATIENT-LVL IV: CPT | Mod: PBBFAC,,, | Performed by: INTERNAL MEDICINE

## 2022-08-22 RX ORDER — MELATONIN 3 MG
CAPSULE ORAL
COMMUNITY

## 2022-08-22 RX ORDER — OLMESARTAN MEDOXOMIL 20 MG/1
20 TABLET ORAL DAILY
Qty: 30 TABLET | Refills: 3 | Status: SHIPPED | OUTPATIENT
Start: 2022-08-22 | End: 2022-11-21

## 2022-08-22 RX ORDER — MULTIVIT WITH MINERALS/HERBS
1 TABLET ORAL DAILY
COMMUNITY

## 2022-08-22 NOTE — PROGRESS NOTES
INTERNAL MEDICINE ESTABLISHED PATIENT VISIT NOTE    Subjective:     Chief Complaint: Follow-up       Patient ID: Aviva Kaur is a 89 y.o. female with HTN, MICHELLE, nephrolithiasis, hemorrhoids, insomnia, last seen by me 6/2022, here today for follow-up. Accompanied by sons today.     Continuing to have memory difficulties, related to retention of short term details. Sons note any deviation from routine difficult for pt.     Taking Melatonin along with Trazodone, helpful with sleep.     Reported difficulty swallowing Irbesartan due to size of medication.     Past Medical History:  Past Medical History:   Diagnosis Date    Anxiety     Hypertension     Nephrolithiasis        Review of Systems:  Review of Systems   Constitutional: Negative for chills and fever.   HENT: Negative for congestion.    Respiratory: Negative for cough and shortness of breath.    Cardiovascular: Negative for chest pain.   Gastrointestinal: Negative for constipation, nausea and vomiting.   Genitourinary: Negative for hematuria and urgency.   Musculoskeletal: Negative for falls.   Skin: Negative for rash.   Neurological: Negative for dizziness and loss of consciousness.   Psychiatric/Behavioral: Positive for memory loss. The patient does not have insomnia.        Health Maintenance:   Immunizations:   Influenza - fall 2022  Tdap - next visit  Covid 19 - complete  HPV  Prevnar rec at 65 - next visit  Shingrix rec at 50 - next visit     Cancer Screening:  PAP: n/a  Mammogram:  n/a  Colonoscopy:  n/a  DEXA:  n/a    Objective:   /78 (BP Location: Left arm, Patient Position: Sitting, BP Method: Medium (Manual))   Pulse 89   Resp (!) 98   Ht 5' (1.524 m)   Wt 52.8 kg (116 lb 6.5 oz)   BMI 22.73 kg/m²        Labs:       Assessment/Plan     Memory change  MOCA administered with assistance of sons, approximate score of 17-18  Consistent with moderate cognitive impairment  Continue B complex supplement; refer to Neuropsychology for  additional testing  -     Ambulatory referral/consult to Adult Neuropsychology; Future; Expected date: 08/29/2022  -     WALKER FOR HOME USE    Insomnia, unspecified type  Controlled per family, continue current regimen  -     WALKER FOR HOME USE    Primary hypertension  Controlled; given swallowing difficulty with Irbesartan due to size, switch to Olmesartan  Family to confirm smaller rx size with pharmacy prior to picking up  Pt and family advised to monitor BP, notify clinic of low/elevated readings  -     olmesartan (BENICAR) 20 MG tablet; Take 1 tablet (20 mg total) by mouth once daily.  Dispense: 30 tablet; Refill: 3  -     WALKER FOR HOME USE    Pill dysphagia  As above    HM as above    RTC in 3 mo, sooner if needed.    Jennifer Sandoval MD  Department of Internal Medicine - [facility]  08/22/2022

## 2022-08-30 ENCOUNTER — PATIENT MESSAGE (OUTPATIENT)
Dept: INTERNAL MEDICINE | Facility: CLINIC | Age: 87
End: 2022-08-30
Payer: MEDICAID

## 2022-09-07 ENCOUNTER — TELEPHONE (OUTPATIENT)
Dept: NEUROLOGY | Facility: CLINIC | Age: 87
End: 2022-09-07
Payer: MEDICAID

## 2022-09-07 NOTE — TELEPHONE ENCOUNTER
----- Message from Sonal Osei sent at 8/23/2022  1:38 PM CDT -----  Contact: @[phone]  Caller (Beatriz) is calling in stating that she was referred by Dr. Sandoval to see a neurologist for dementia. Please call to discuss further.

## 2022-09-26 ENCOUNTER — HOSPITAL ENCOUNTER (EMERGENCY)
Facility: HOSPITAL | Age: 87
Discharge: ANOTHER HOSPITAL | End: 2022-09-26
Attending: EMERGENCY MEDICINE
Payer: MEDICARE

## 2022-09-26 VITALS
BODY MASS INDEX: 20.16 KG/M2 | WEIGHT: 100 LBS | HEART RATE: 91 BPM | RESPIRATION RATE: 18 BRPM | HEIGHT: 59 IN | TEMPERATURE: 98 F | SYSTOLIC BLOOD PRESSURE: 175 MMHG | DIASTOLIC BLOOD PRESSURE: 77 MMHG | OXYGEN SATURATION: 95 %
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
## 2022-09-26 LAB
ALBUMIN SERPL BCP-MCNC: 3.8 G/DL (ref 3.5–5.2)
ALP SERPL-CCNC: 77 U/L (ref 55–135)
ALT SERPL W/O P-5'-P-CCNC: 20 U/L (ref 10–44)
ANION GAP SERPL CALC-SCNC: 10 MMOL/L (ref 8–16)
AST SERPL-CCNC: 46 U/L (ref 10–40)
BACTERIA #/AREA URNS AUTO: ABNORMAL /HPF
BASOPHILS # BLD AUTO: 0.05 K/UL (ref 0–0.2)
BASOPHILS NFR BLD: 0.5 % (ref 0–1.9)
BILIRUB SERPL-MCNC: 0.5 MG/DL (ref 0.1–1)
BILIRUB UR QL STRIP: NEGATIVE
BNP SERPL-MCNC: 358 PG/ML (ref 0–99)
BUN SERPL-MCNC: 17 MG/DL (ref 10–30)
CALCIUM SERPL-MCNC: 9.2 MG/DL (ref 8.7–10.5)
CHLORIDE SERPL-SCNC: 99 MMOL/L (ref 95–110)
CLARITY UR REFRACT.AUTO: CLEAR
CO2 SERPL-SCNC: 24 MMOL/L (ref 23–29)
COLOR UR AUTO: YELLOW
CREAT SERPL-MCNC: 0.7 MG/DL (ref 0.5–1.4)
DIFFERENTIAL METHOD: ABNORMAL
EOSINOPHIL # BLD AUTO: 0 K/UL (ref 0–0.5)
EOSINOPHIL NFR BLD: 0.2 % (ref 0–8)
ERYTHROCYTE [DISTWIDTH] IN BLOOD BY AUTOMATED COUNT: 13 % (ref 11.5–14.5)
EST. GFR  (NO RACE VARIABLE): >60 ML/MIN/1.73 M^2
GLUCOSE SERPL-MCNC: 90 MG/DL (ref 70–110)
GLUCOSE UR QL STRIP: NEGATIVE
HCT VFR BLD AUTO: 39.9 % (ref 37–48.5)
HGB BLD-MCNC: 12.9 G/DL (ref 12–16)
HGB UR QL STRIP: ABNORMAL
IMM GRANULOCYTES # BLD AUTO: 0.03 K/UL (ref 0–0.04)
IMM GRANULOCYTES NFR BLD AUTO: 0.3 % (ref 0–0.5)
KETONES UR QL STRIP: NEGATIVE
LEUKOCYTE ESTERASE UR QL STRIP: ABNORMAL
LYMPHOCYTES # BLD AUTO: 1.1 K/UL (ref 1–4.8)
LYMPHOCYTES NFR BLD: 10.5 % (ref 18–48)
MCH RBC QN AUTO: 31.2 PG (ref 27–31)
MCHC RBC AUTO-ENTMCNC: 32.3 G/DL (ref 32–36)
MCV RBC AUTO: 96 FL (ref 82–98)
MICROSCOPIC COMMENT: ABNORMAL
MONOCYTES # BLD AUTO: 0.9 K/UL (ref 0.3–1)
MONOCYTES NFR BLD: 8.7 % (ref 4–15)
NEUTROPHILS # BLD AUTO: 8.3 K/UL (ref 1.8–7.7)
NEUTROPHILS NFR BLD: 79.8 % (ref 38–73)
NITRITE UR QL STRIP: NEGATIVE
NRBC BLD-RTO: 0 /100 WBC
PH UR STRIP: 7 [PH] (ref 5–8)
PLATELET # BLD AUTO: 300 K/UL (ref 150–450)
PMV BLD AUTO: 9.4 FL (ref 9.2–12.9)
POTASSIUM SERPL-SCNC: 3.7 MMOL/L (ref 3.5–5.1)
PROT SERPL-MCNC: 7.2 G/DL (ref 6–8.4)
PROT UR QL STRIP: NEGATIVE
RBC # BLD AUTO: 4.14 M/UL (ref 4–5.4)
RBC #/AREA URNS AUTO: 0 /HPF (ref 0–4)
SODIUM SERPL-SCNC: 133 MMOL/L (ref 136–145)
SP GR UR STRIP: 1.01 (ref 1–1.03)
SQUAMOUS #/AREA URNS AUTO: 1 /HPF
TROPONIN I SERPL DL<=0.01 NG/ML-MCNC: 0.06 NG/ML (ref 0–0.03)
TROPONIN I SERPL DL<=0.01 NG/ML-MCNC: 0.09 NG/ML (ref 0–0.03)
URN SPEC COLLECT METH UR: ABNORMAL
WBC # BLD AUTO: 10.33 K/UL (ref 3.9–12.7)
WBC #/AREA URNS AUTO: 2 /HPF (ref 0–5)

## 2022-09-26 PROCEDURE — 84484 ASSAY OF TROPONIN QUANT: CPT | Mod: 91 | Performed by: EMERGENCY MEDICINE

## 2022-09-26 PROCEDURE — 81001 URINALYSIS AUTO W/SCOPE: CPT

## 2022-09-26 PROCEDURE — 25500020 PHARM REV CODE 255: Performed by: EMERGENCY MEDICINE

## 2022-09-26 PROCEDURE — 83880 ASSAY OF NATRIURETIC PEPTIDE: CPT

## 2022-09-26 PROCEDURE — 80053 COMPREHEN METABOLIC PANEL: CPT | Performed by: EMERGENCY MEDICINE

## 2022-09-26 PROCEDURE — 25000003 PHARM REV CODE 250

## 2022-09-26 PROCEDURE — 93010 ELECTROCARDIOGRAM REPORT: CPT | Mod: ,,, | Performed by: INTERNAL MEDICINE

## 2022-09-26 PROCEDURE — 99285 EMERGENCY DEPT VISIT HI MDM: CPT | Mod: GC,,, | Performed by: EMERGENCY MEDICINE

## 2022-09-26 PROCEDURE — 93005 ELECTROCARDIOGRAM TRACING: CPT

## 2022-09-26 PROCEDURE — 85025 COMPLETE CBC W/AUTO DIFF WBC: CPT

## 2022-09-26 PROCEDURE — 99285 PR EMERGENCY DEPT VISIT,LEVEL V: ICD-10-PCS | Mod: GC,,, | Performed by: EMERGENCY MEDICINE

## 2022-09-26 PROCEDURE — 93010 EKG 12-LEAD: ICD-10-PCS | Mod: ,,, | Performed by: INTERNAL MEDICINE

## 2022-09-26 PROCEDURE — 84484 ASSAY OF TROPONIN QUANT: CPT

## 2022-09-26 PROCEDURE — 99285 EMERGENCY DEPT VISIT HI MDM: CPT | Mod: 25

## 2022-09-26 RX ORDER — LOSARTAN POTASSIUM 50 MG/1
50 TABLET ORAL
Status: COMPLETED | OUTPATIENT
Start: 2022-09-26 | End: 2022-09-26

## 2022-09-26 RX ORDER — AMLODIPINE BESYLATE 5 MG/1
5 TABLET ORAL
Status: COMPLETED | OUTPATIENT
Start: 2022-09-26 | End: 2022-09-26

## 2022-09-26 RX ADMIN — LOSARTAN POTASSIUM 50 MG: 50 TABLET, FILM COATED ORAL at 05:09

## 2022-09-26 RX ADMIN — IOHEXOL 75 ML: 350 INJECTION, SOLUTION INTRAVENOUS at 03:09

## 2022-09-26 RX ADMIN — AMLODIPINE BESYLATE 5 MG: 5 TABLET ORAL at 05:09

## 2022-09-26 NOTE — ED NOTES
VICTORIA Liriano, at bedside speaking with pt family about possible transfer to [facility] per family request

## 2022-09-26 NOTE — ED PROVIDER NOTES
Encounter Date: 9/26/2022       History     Chief Complaint   Patient presents with    Fall     Stays in assisted living, found on floor, denies hitting head, legs feeling week, trouble walking both hips hurting     89-year-old female with past medical history of hypertension and mild dementia, not on blood thinners who resides in assisted living.  Patient presents with a new onset 1 day history of fall.  The patient's daughter is bedside who reports that at 6:00 a.m. the nursing staff found the patient on the floor and the patient was last seen well last night before bedtime. The patient was able to give history and reports that during the night she got tangled in her bed sheets when she wanted to go to the toilet, then proceeded to crawl to the bathroom and was unable to get up due to pain in her hips. The patient denies any preceding dizziness, chest pain, and the daughter reports that the patient has not appeared confused.    The history is provided by the patient (Daughter).   Review of patient's allergies indicates:   Allergen Reactions    Cortisone Other (See Comments)     Increased blood pressure    Penicillins      Past Medical History:   Diagnosis Date    Anxiety     Hypertension     Nephrolithiasis      Past Surgical History:   Procedure Laterality Date    HYSTERECTOMY      internal bladder stimulation system       Family History   Problem Relation Age of Onset    Brain cancer Mother         tumor    Colon cancer Neg Hx     Breast cancer Neg Hx     Heart attack Neg Hx      Social History     Tobacco Use    Smoking status: Never    Smokeless tobacco: Never   Substance Use Topics    Alcohol use: Never    Drug use: Never     Review of Systems   Constitutional:  Negative for chills, fatigue and fever.   HENT:  Negative for ear discharge, facial swelling and hearing loss.    Eyes:  Negative for photophobia and visual disturbance.   Respiratory:  Negative for cough, choking, chest tightness, shortness of  breath, wheezing and stridor.    Cardiovascular:  Negative for chest pain, palpitations and leg swelling.   Gastrointestinal:  Positive for abdominal pain. Negative for abdominal distention, constipation, diarrhea, nausea and vomiting.   Genitourinary:  Negative for decreased urine volume, difficulty urinating and urgency.   Musculoskeletal:  Positive for arthralgias, back pain and gait problem. Negative for joint swelling, myalgias, neck pain and neck stiffness.   Skin:  Negative for pallor and rash.   Neurological:  Positive for weakness. Negative for tremors, seizures, syncope, speech difficulty, numbness and headaches.   Psychiatric/Behavioral:  Negative for confusion.      Physical Exam     Initial Vitals [09/26/22 1052]   BP Pulse Resp Temp SpO2   127/60 81 18 96 °F (35.6 °C) 96 %      MAP       --         Physical Exam    Nursing note and vitals reviewed.  Constitutional: She appears well-developed and well-nourished. She is not diaphoretic. No distress.   HENT:   Head: Atraumatic.   Nose: Nose normal.   Eyes: Conjunctivae are normal. Pupils are equal, round, and reactive to light.   Cardiovascular:  Normal rate and regular rhythm.           No murmur heard.  Pulmonary/Chest: Breath sounds normal. She has no wheezes. She has no rales.   Abdominal:   No distension  Soft  Suprapubic tenderness  No CVA tenderness   Musculoskeletal:         General: Normal range of motion.      Comments: No limb length discrepancy, deformities, or swellings  CRT <2s of bilateral lower limbs.  Tenderness to palpation over bilateral hips (R>L), knees, ankles  Normal ROM of bilateral LL        Lymphadenopathy:     She has no cervical adenopathy.   Neurological: She is alert and oriented to person, place, and time. She has normal strength. No cranial nerve deficit or sensory deficit.   Skin: Skin is warm. Capillary refill takes less than 2 seconds.       ED Course   Procedures  Labs Reviewed   CBC W/ AUTO DIFFERENTIAL - Abnormal;  Notable for the following components:       Result Value    MCH 31.2 (*)     Gran # (ANC) 8.3 (*)     Gran % 79.8 (*)     Lymph % 10.5 (*)     All other components within normal limits   B-TYPE NATRIURETIC PEPTIDE - Abnormal; Notable for the following components:     (*)     All other components within normal limits   TROPONIN I - Abnormal; Notable for the following components:    Troponin I 0.093 (*)     All other components within normal limits   URINALYSIS, REFLEX TO URINE CULTURE - Abnormal; Notable for the following components:    Occult Blood UA Trace (*)     Leukocytes, UA Trace (*)     All other components within normal limits    Narrative:     Specimen Source->Urine   URINALYSIS MICROSCOPIC - Abnormal; Notable for the following components:    Bacteria Many (*)     All other components within normal limits    Narrative:     Specimen Source->Urine   COMPREHENSIVE METABOLIC PANEL - Abnormal; Notable for the following components:    Sodium 133 (*)     AST 46 (*)     All other components within normal limits   TROPONIN I     EKG Readings: (Independently Interpreted)   Rate 81  Rhythm irregular with narrow QRS preceded by P Wave  Intervals normal  ST no elevations or depressions    Sinus arrhythmia       Imaging Results              CT Entire Spine Without Contrast (In process)  Result time 09/26/22 16:04:23                     CT Head Without Contrast (Final result)  Result time 09/26/22 15:46:59      Final result by Jon Landaverde MD (09/26/22 15:46:59)                   Impression:      No evidence of acute intracranial hemorrhage.      Electronically signed by: Jon Landaverde MD  Date:    09/26/2022  Time:    15:46               Narrative:    EXAMINATION:  CT HEAD WITHOUT CONTRAST    CLINICAL HISTORY:  Head trauma, intracranial arterial injury suspected;    TECHNIQUE:  Low dose axial CT images obtained throughout the head without the use of intravenous contrast.  Axial, sagittal and coronal  reconstructions were performed.    COMPARISON:  None.    FINDINGS:  Intracranial compartment:    Pattern of  cerebral volume loss, without evidence of hydrocephalus.    Mild patchy hypoattenuation in the supratentorial white matter, nonspecific but most likely reflecting chronic small vessel ischemic changes. No recent or remote major vascular distribution infarct. No acute hemorrhage.  No mass effect or midline shift.    No extra-axial blood or fluid collections.    Skull/extracranial contents (limited evaluation):    No displaced calvarial fracture.    The mastoid air cells and visualized paranasal sinuses are essentially clear.                                       CT Abdomen Pelvis With Contrast (In process)  Result time 09/26/22 16:35:08                     X-Ray Knee 1 or 2 View Bilateral (Final result)  Result time 09/26/22 13:34:22      Final result by Chandana Finn MD (09/26/22 13:34:22)                   Impression:      No convincing evidence of acute fracture or dislocation.      Electronically signed by: Chandana Finn  Date:    09/26/2022  Time:    13:34               Narrative:    EXAMINATION:  XR KNEE 1 OR 2 VIEW BILATERAL    CLINICAL HISTORY:  Unspecified fall, initial encounter    TECHNIQUE:  Four views bilateral knees.    COMPARISON:  None    FINDINGS:  Osseous demineralization.    Left: No definite evidence of acute fracture or dislocation.  DJD.  Vascular calcifications.  Enthesopathic change at the patella.    Right: No definite evidence of acute fracture or dislocation.  DJD.  Vascular calcifications. Enthesopathic change at the patella.                                       X-Ray Tibia Fibula Bilateral (Final result)  Result time 09/26/22 13:30:34      Final result by Chandana Finn MD (09/26/22 13:30:34)                   Impression:      No convincing evidence of acute fracture or dislocation.      Electronically signed by: Chandana Finn  Date:    09/26/2022  Time:    13:30                Narrative:    EXAMINATION:  XR TIBIA FIBULA BILATERAL    CLINICAL HISTORY:  Unspecified fall, initial encounter    TECHNIQUE:  Four views bilateral tibia fibula.    COMPARISON:  None    FINDINGS:  Osseous demineralization    Right: No definite evidence of acute fracture or dislocation.  Degenerative findings are noted about the knee joint.  Enthesopathic change of the patella.    No definite radiopaque foreign body.  Vascular calcifications.    Left: No definite evidence of acute fracture or dislocation.  Degenerative findings are noted about the knee joint.  The is a pathic change at the patella.    No definite radiopaque foreign body.  Vascular calcifications.                                        X-Ray Femur Ap/Lat Bilateral (Final result)  Result time 09/26/22 13:30:19      Final result by Cristopher Stout MD (09/26/22 13:30:19)                   Impression:      As above    This report was flagged in Epic as abnormal.      Electronically signed by: Cristopher Stout MD  Date:    09/26/2022  Time:    13:30               Narrative:    EXAMINATION:  XR FEMUR 2 VIEW BILATERAL    CLINICAL HISTORY:  Unspecified fall, initial encounter    TECHNIQUE:  Eight images submitted including AP and lateral views of each femur    COMPARISON:  None    FINDINGS:  Left femur: Generalized osteopenia.  The left femur appears intact.  No acute fracture, dislocation, or osseous destruction.  There is some deformity of the inferior pubic ramus on the left which I suspect represents an old healed fracture.  If clinically indicated, additional views of this area could be obtained for further evaluation.  Degenerative changes at the left hip.  Stimulator device seen within the pelvis.  Joint space narrowing at the tibiofemoral joint.  Degenerative changes also seen at the patellofemoral joint.  Atherosclerotic vascular calcifications present.    Right femur: Generalized osteopenia.  The right femur appears intact.  No fracture, dislocation,  or osseous destruction.  Mild degenerative changes at the right hip.  Some calcifications are seen within the soft tissues about the right hip and inguinal region.  These appear well corticated.  Joint space narrowing at the right tibiofemoral joint.  Degenerative changes involving the patella.  Atherosclerotic vascular calcifications are noted.                                       Medications   iohexoL (OMNIPAQUE 350) injection 75 mL (75 mLs Intravenous Given 9/26/22 1535)     Medical Decision Making:   History:   I obtained history from: someone other than patient.  Old Medical Records: I decided to obtain old medical records.  Initial Assessment:   89-year-old female with past medical history of hypertension and mild dementia, not on blood thinners who resides in assisted living presenting with a new onset 1 day history of fall.  Examination shows suprapubic tenderness and bilateral hip pain and lower limb pain  Differential Diagnosis:   Initial impression is concerning for mechanical fall complicated by fracture, delirium he due to infection (UTI).  Less concerned for given history and physical examination but considered CVA.  ED Management:  See ED course           ED Course as of 09/26/22 1644   Mon Sep 26, 2022   1310 WBC: 10.33 [PM]   1310 Hemoglobin: 12.9 [PM]   1310 Platelets: 300 [PM]   1351 Bacteria, UA(!): Many  UA consistent with UTI. [PM]   1351 BNP(!): 358 [PM]   1351 Troponin I(!): 0.093  Decided to repeat trop and EKG to establish trend [PM]   1352 WBC: 10.33 [PM]   1352 Hemoglobin: 12.9 [PM]   1352 Leukocytes, UA(!): Trace [PM]   1640 Patient signed out to oncoming team.  Patient's condition is likely due to UTI and possible fracture.  Patient pending CT Abdo CT head and CT spine.   Likely dispo is admission [PM]      ED Course User Index  [PM] Lorenzo Chávez MD                   Clinical Impression:   Final diagnoses:  [W19.XXXA] Fall               Lorenzo Chávez MD  Resident  09/26/22  [id number]

## 2022-09-26 NOTE — PROVIDER PROGRESS NOTES - EMERGENCY DEPT.
"ED Resident HAND-OFF NOTE:  3:34 PM 9/26/2022  Aviva Kaur is a 89 y.o. female who presented to the ED on 9/26/2022 and has been managed by Dr. Martin and Dr. Chávez, who reports patient C/O mechanical fall. I assumed care of patient from off-going ED physician team at 3:34 PM pending CT abdomen pelvis, head and spine.    On my evaluation, Aviva Kaur appears well, hemodynamically stable and in NAD. Thus far, Aviva Kaur has received:  Medications   iohexoL (OMNIPAQUE 350) injection 75 mL (75 mLs Intravenous Given 9/26/22 1535)   amLODIPine tablet 5 mg (5 mg Oral Given 9/26/22 1749)   losartan tablet 50 mg (50 mg Oral Given 9/26/22 1749)       On my exam, I appreciate:  BP (!) 175/77   Pulse 91   Temp 97.7 °F (36.5 °C) (Oral)   Resp 18   Ht 4' 11" (1.499 m)   Wt 45.4 kg (100 lb)   SpO2 95%   BMI 20.20 kg/m²     Repeat troponin is down trending.  No acute EKG changes are seen.  Patient does not have any active chest pain.  Remaining Radiology is unremarkable.  X-ray of the pelvis shows and old, healed left inferior pubic rami fracture.  Urinalysis is concerning for a UTI.  Patient's family members would prefer that the patient be admitted to [facility] for convenience. Discussed the case with [facility] physician Dr. Escoto who is willing to accept the transfer.  Patient and family members understand.  ______________________  Maru Olson MD  Emergency Medicine Resident  3:34 PM 9/26/2022       "

## 2022-09-26 NOTE — ED NOTES
"Patient identifiers verified and correct for king reno  C/C: Pt states "I was having difficulty walking today. I fell and hit my L elbow and knees"  APPEARANCE: awake and alert in no acute distress.  SKIN: warm, dry and intact. Skin tear on L elbow, bruising noted to that area as well   MUSCULOSKELETAL: Patient moving all extremities spontaneously, no obvious swelling or deformities noted. Ambulates with walker, c/o generalized weakness.  RESPIRATORY: Denies shortness of breath. Respirations unlabored.   CARDIAC: Denies CP, 2+ distal pulses; no peripheral edema  ABDOMEN: soft, non-tender, and non-distended, Denies N/V  : voids spontaneously, denies difficulty  Neurologic: AAO x 3, disoriented to time; follows commands equal strength in all extremities; denies numbness/tingling. Denies dizziness    "

## 2022-09-26 NOTE — PROVIDER PROGRESS NOTES - EMERGENCY DEPT.
Encounter Date: 9/26/2022    ED Physician Progress Notes          Physician Attestation Statement for Resident:  As the supervising MD   Physician Attestation Statement: I have personally seen and examined this patient.       ***I agree with the history unless otherwise noted.     ***As the supervising MD I agree with the PE unless otherwise noted.      ***I have reviewed and agree with the residents interpretation of the following unless otherwise noted:   ***lab data  ***imaging studies    ***EKG and rhythm strips.  Normal sinus rhythm at 75 beats per minute, QRS 76, , no ischemic ST/T-wave changes    ***I have also reviewed the following:   ***old records at this facility,   ***records from referring facility   ***old EKGs,   ***old imaging and results    ***As the supervising MD I agree with the treatment, course, plan, and disposition unless otherwise noted.

## 2022-09-27 PROBLEM — R53.81 PHYSICAL DECONDITIONING: Status: ACTIVE | Noted: 2022-09-27

## 2022-09-27 PROBLEM — I21.4 NSTEMI (NON-ST ELEVATED MYOCARDIAL INFARCTION): Status: ACTIVE | Noted: 2022-09-27

## 2022-09-27 PROBLEM — N39.0 ACUTE UTI: Status: ACTIVE | Noted: 2022-09-27

## 2022-09-27 NOTE — ED NOTES
Received report from KARINA Greene. Pt resting comfortably in bed. No complaints at this time. Family at bedside. Family updated on pt's transfer status. Family on phone with [facility] attempting to facilitate transfer. Family made aware that pt does not currently have a bed assignment at receiving facility. Charge nurse made aware. Bed locked in lowest position. SR up x 2. VSS.

## 2022-09-27 NOTE — ED NOTES
Patient transferred to [facility] via [facility]. Report given to nurse for room 316. Pt transported with all belongings and signed transfer form.
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
## 2022-10-06 ENCOUNTER — OFFICE VISIT (OUTPATIENT)
Dept: NEUROLOGY | Facility: CLINIC | Age: 87
End: 2022-10-06
Payer: MEDICARE

## 2022-10-06 VITALS
HEART RATE: 66 BPM | DIASTOLIC BLOOD PRESSURE: 72 MMHG | SYSTOLIC BLOOD PRESSURE: 135 MMHG | HEIGHT: 59 IN | WEIGHT: 117.19 LBS | BODY MASS INDEX: 23.63 KG/M2

## 2022-10-06 DIAGNOSIS — R41.3 MEMORY CHANGE: ICD-10-CM

## 2022-10-06 DIAGNOSIS — F02.818 ALZHEIMER'S DEMENTIA WITH BEHAVIORAL DISTURBANCE: Primary | ICD-10-CM

## 2022-10-06 DIAGNOSIS — Z87.440 HISTORY OF UTI: ICD-10-CM

## 2022-10-06 DIAGNOSIS — R13.10 DYSPHAGIA, UNSPECIFIED TYPE: ICD-10-CM

## 2022-10-06 DIAGNOSIS — G30.9 ALZHEIMER'S DEMENTIA WITH BEHAVIORAL DISTURBANCE: Primary | ICD-10-CM

## 2022-10-06 DIAGNOSIS — R41.0 DELIRIUM: ICD-10-CM

## 2022-10-06 PROCEDURE — 99999 PR PBB SHADOW E&M-EST. PATIENT-LVL III: CPT | Mod: PBBFAC,,,

## 2022-10-06 PROCEDURE — 96132 NRPSYC TST EVAL PHYS/QHP 1ST: CPT | Mod: ,,, | Performed by: PSYCHIATRY & NEUROLOGY

## 2022-10-06 PROCEDURE — 96132 PR NEUROPSYCHOLOGIC TEST EVAL SVCS, 1ST HR: ICD-10-PCS | Mod: ,,, | Performed by: PSYCHIATRY & NEUROLOGY

## 2022-10-06 PROCEDURE — 99215 PR OFFICE/OUTPT VISIT, EST, LEVL V, 40-54 MIN: ICD-10-PCS | Mod: FS,S$PBB,, | Performed by: NURSE PRACTITIONER

## 2022-10-06 PROCEDURE — 96133 PR NEUROPSYCHOLOGIC TEST EVAL SVCS, EA ADDTL HR: ICD-10-PCS | Mod: ,,, | Performed by: PSYCHIATRY & NEUROLOGY

## 2022-10-06 PROCEDURE — 96116 PR NEUROBEHAVIORAL STATUS EXAM BY PSYCH/PHYS: ICD-10-PCS | Mod: S$PBB,,, | Performed by: PSYCHIATRY & NEUROLOGY

## 2022-10-06 PROCEDURE — 96138 PR PSYCH/NEUROPSYCH TEST ADMIN/SCORING, BY TECH, 2+ TESTS, 1ST 30 MIN: ICD-10-PCS | Mod: ,,, | Performed by: PSYCHIATRY & NEUROLOGY

## 2022-10-06 PROCEDURE — 96116 NUBHVL XM PHYS/QHP 1ST HR: CPT | Mod: PBBFAC | Performed by: PSYCHIATRY & NEUROLOGY

## 2022-10-06 PROCEDURE — 96139 PSYCL/NRPSYC TST TECH EA: CPT | Mod: ,,, | Performed by: PSYCHIATRY & NEUROLOGY

## 2022-10-06 PROCEDURE — 99999 PR PBB SHADOW E&M-EST. PATIENT-LVL III: ICD-10-PCS | Mod: PBBFAC,,,

## 2022-10-06 PROCEDURE — 96138 PSYCL/NRPSYC TECH 1ST: CPT | Mod: ,,, | Performed by: PSYCHIATRY & NEUROLOGY

## 2022-10-06 PROCEDURE — 99499 UNLISTED E&M SERVICE: CPT | Mod: S$PBB,,, | Performed by: PSYCHIATRY & NEUROLOGY

## 2022-10-06 PROCEDURE — 96116 NUBHVL XM PHYS/QHP 1ST HR: CPT | Mod: S$PBB,,, | Performed by: PSYCHIATRY & NEUROLOGY

## 2022-10-06 PROCEDURE — 99215 OFFICE O/P EST HI 40 MIN: CPT | Mod: FS,S$PBB,, | Performed by: NURSE PRACTITIONER

## 2022-10-06 PROCEDURE — 99213 OFFICE O/P EST LOW 20 MIN: CPT | Mod: PBBFAC,25

## 2022-10-06 PROCEDURE — 96139 PR PSYCH/NEUROPSYCH TEST ADMIN/SCORING, BY TECH, 2+ TESTS, EA ADDTL 30 MIN: ICD-10-PCS | Mod: ,,, | Performed by: PSYCHIATRY & NEUROLOGY

## 2022-10-06 PROCEDURE — 96133 NRPSYC TST EVAL PHYS/QHP EA: CPT | Mod: ,,, | Performed by: PSYCHIATRY & NEUROLOGY

## 2022-10-06 PROCEDURE — 99499 NO LOS: ICD-10-PCS | Mod: S$PBB,,, | Performed by: PSYCHIATRY & NEUROLOGY
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
## 2022-10-06 NOTE — PATIENT INSTRUCTIONS
What is delirium?  Delirium is a state of confusion that comes on very suddenly and lasts hours to days. When your loved one becomes delirious, it means she/he cannot think very clearly, cant pay attention and is not really aware of their environment. Sometimes people will call it other things such as a change in mental status, sundowning or ICU psychosis, but it is all delirium.  There are abnormal changes in the person's level of consciousness and thinking. The person may be sleepy, or may appear to be withdrawn and depressed (hypoactive delirium) or agitated (hyperactive delirium), or alternate between these states. The changes may be subtle initially.  The person often has difficulty maintaining focus. He/she may change the subject frequently in a conversation, have difficulty retaining new information, mention strange ideas, or be disoriented (in place or in time). Some patients have visual hallucinations.  Younger people tend to recover more quickly, while those who are older or already have a chronic or serious medical condition will take longer to recover so that the delirium can continue off and on for a period of weeks or months even after the acute medical illness has settled.    What can cause it?  Many things can cause delirium including medications, dehydration, infections and lack of sleep. People can develop delirium following a surgical procedure. Often, a combination of problems causes delirium.  Many things can make delirium worse including physical restraints, bed rest, bladder catheters and certain medications.  The important point is that doctors and other health care providers should look for the causes and treat them; and they should be careful not to do things that might make delirium worse.    What are the risk factors?  Advanced age  Underlying brain diseases such as dementia, stroke, or Parkinson disease, particularly when there are current problems with memory  Use of multiple  medications (particularly psychiatric drugs and sedatives), or multiple medical problems  Sudden withdrawal of a regular medication or cessation of regular alcohol use  Frailty, malnutrition, immobility  Advanced cancer  Undertreated pain (although excessive use of opioid pain medication for pain control can also impair brain function)  Immobilization, including physical restraints  Use of bladder catheters  Limb fractures  Interventions, including diagnostic tests  Poor eyesight or hearing  Sleep deprivation  Organ failure (eg, chronic lung disease; heart, kidney, or liver failure)    My loved one has dementia; could that be causing this confusion?  Delirium and dementia can co-exist but they are not the same. Dementia comes on gradually and is a permanent condition.  Delirium can develop suddenly and usually goes away in days to weeks if treated properly.  It is important to note though, that people who have dementia are at an increased risk of developing delirium.    What can I do to help?  If you are a family member or friend, try to make it clear to doctors and other health care providers what your loved ones usual mental status is. (This is often called a patients baseline).  Communicate clearly & concisely with your loved one; dont say too much or bring up complicated issues; you can repeat verbal reminders about what day it is, or the time and location, if it seems to make your loved one feel more secure. However, be careful not to overdo this as it can cause frustration or agitation sometimes.  Your loved one may not recognize you. If this is the case, do not take it personally, this is a common occurrence and an accepted part of the condition. Introduce yourself each time if necessary.  Your loved one may say and do things that are completely out of character. Remind yourself that this is due to the delirium and that this will recover when the delirium improves or resolves.  If you try to help your  loved one with some basic needs (getting to the chair or bathroom, helping them eat or dress), try to keep instructions simple (this is called a one-step command); if your loved ones cant do what needs to be done, do not argue or try to reason; simply try later.  Bring in familiar objects from your loved ones home.  You can use TV or radio for relaxation and to help maintain contact with outside world but be careful because in some cases  TV and radio (external stimulation) may cause anxiety and agitation.  Remind yourself that hallucinations and delusions are related to the delirium; do not directly dispute hallucinations and delusions expressed by your loved ones; instead provide reassurance.  Related to the previous point: If your loved one is fixated or stuck on a topic or issue that is causing them anxiety or agitation, sometimes changing the subject or the environment (getting them out of the bed or room) works better than trying to resolve the issue.  Discuss with other family and friends who are visitors about the above, and remind them one of the most important strategies in the care of someone who has delirium is to help them feel secure and safe.    Can I prevent it from happening again?  Once you've had an episode of delirium, you are at increased risk for more episodes. The following strategies are helpful to prevent delirium whenever possible:  Ensure the person gets adequate sleep at night. Contact their medical team if sleep is a problem.   Ensure they eat healthy foods and drink enough water. Staying hydrated is especially important.   Avoid alcohol and other substances  Avoid suddenly discontinuing any medications, particularly without guidance from medical professionals.   Try to ensure the person gets out of bed at least once per day. Ideally, help them get outside into the sunshine, which will help support their normal circadian rhythm.   For those who are cognitively intact, provide  cognitive stimulation, such as large-print magazines, or crossword or word search puzzles.  If your loved one wears glasses, ask him/her to put them on. If they have still have poor vision, consider the following:   Make sure they are wearing their glasses whenever they are awake.  Provide a magnifier and large-print menus, books, and magazines.  Put red tape on call bell, water pitcher, TV remote control, telephone, and personal items to make them easier to locate.  If the patient is hard of hearing, consider the following:   Use a portable amplifying device (Pocket Talker).  Limit background noise and face the patient, but don't shout.  If she uses a hearing aid, make sure she's wearing it and that it's working properly.  If the patient uses a catheter, check in frequently for signs of a urinary tract infection.   Ensure pain is adequately treated    Adapted from: https://www.uptodate.com/contents/delirium-beyond-the-basics#H3  https://americandeliriumsociety.org/about-delirium/patientfamily  https://journals.lww.com/nursing/FullText/2007/78323/How_to_prevent_delirium__A_practical_protocol.17.aspx

## 2022-10-25 PROBLEM — G30.9 ALZHEIMER'S DEMENTIA WITH BEHAVIORAL DISTURBANCE: Status: ACTIVE | Noted: 2022-10-25

## 2022-10-25 PROBLEM — F02.818 ALZHEIMER'S DEMENTIA WITH BEHAVIORAL DISTURBANCE: Status: ACTIVE | Noted: 2022-10-25

## 2022-11-10 DIAGNOSIS — I10 HYPERTENSION, UNSPECIFIED TYPE: ICD-10-CM

## 2022-11-10 RX ORDER — IRBESARTAN 300 MG/1
300 TABLET ORAL EVERY MORNING
Qty: 90 TABLET | Refills: 1 | OUTPATIENT
Start: 2022-11-10

## 2022-11-10 NOTE — TELEPHONE ENCOUNTER
Refill Routing Note   Medication(s) are not appropriate for processing by [facility] for the following reason(s):      - Patient has been seen in the ED/Hospital since the last PCP visit    [facility] action(s):  Route          Medication reconciliation completed: No     Appointments  past 12m or future 3m with PCP    Date Provider   Last Visit   5/9/2022 Dee Cope PA-C   Next Visit   Visit date not found Dee Cope PA-C   ED visits in past 90 days: 1        Note composed:8:39 AM 11/10/2022

## 2022-11-10 NOTE — TELEPHONE ENCOUNTER
Called daughter; unable to leave message    Called son (jacky); left message asking for call back     Called son (dagoberto); unable to leave message    Intent to inform pt's family she should be taking Olmesartan, not Irbesartan.      Unable to inform family

## 2022-11-16 NOTE — PROGRESS NOTES
Name: Aviva Kaur  MRN: [id number]   CSN: [id number]      Date: 11-17-22      Referring physician:  No referring provider defined for this encounter.    Subjective:      Chief Complaint: results     History of Present Illness (HPI):    Aviva Kaur is a 89 y.o.  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 Initially seen in this clinic 10-6-22. Rec SLICK diet for pills as had some trouble swallowing pills only. Resides in [facility] since 1-2022.     She does not recall testing.     Now taking pills with jelly.     Lowered bed and made some accommodations.   She says she likes it there.    Can dress independently.  [facility] gives her meds.   She is able to use microwave and toaster oven.   She gets shower three times week. She has aide.     Some sleep issues. On trazonoe  Will awake up at night to go to bathroom.   Dtr says she looks rested.  Does nap during day.     No mood issues since she does not have UTI.     Drinks a lot of water.         Review of Systems   Unable to perform ROS: Dementia     Past Medical History: The patient  has a past medical history of Anxiety, Hypertension, and Nephrolithiasis.    Social History: The patient  reports that she has never smoked. She has never used smokeless tobacco. She reports that she does not drink alcohol and does not use drugs.    Family History: Their family history includes Brain cancer in her mother.    Allergies: Cortisone and Penicillins     Meds:   Current Outpatient Medications on File Prior to Visit   Medication Sig Dispense Refill    b complex vitamins tablet Take 1 tablet by mouth once daily.      ibuprofen (ADVIL,MOTRIN) 200 MG tablet Take 200 mg by mouth every 6 (six) hours as needed for Pain.      melatonin 3 mg Cap Take by mouth.      polyethylene glycol (GLYCOLAX) 17 gram/dose powder  "Take 17 g by mouth once daily.       No current facility-administered medications on file prior to visit.       Objective:     Physical Exam:    Vitals:    11/17/22 1039   BP: (!) 150/78   BP Location: Right arm   Patient Position: Sitting   Pulse: 75   Weight: 52.8 kg (116 lb 6.5 oz)   Height: 4' 11" (1.499 m)     Body mass index is 23.51 kg/m².    Constitutional  Frail, appears stated age  Appropriately dressed and groomed     Awake, alert, pleasant and cooperative.   RR equal and unlabored. NAD.  Face symmetric.   Hearing intact to conversation.   WC (hospital issued)      Laboratory Results:  Admission on 09/26/2022, Discharged on 09/29/2022   Component Date Value Ref Range Status    BSA 09/27/2022 1.47  m2 Final    TDI SEPTAL 09/27/2022 0.02  m/s Final    LV LATERAL E/E' RATIO 09/27/2022 35.00  m/s Final    LV SEPTAL E/E' RATIO 09/27/2022 52.50  m/s Final    IVC diameter 09/27/2022 0.78  cm Final    Left Ventricular Outflow Tract Rafaela* 09/27/2022 0.78  cm/s Final    Left Ventricular Outflow Tract Rafaela* 09/27/2022 2.88  mmHg Final    AORTIC VALVE CUSP SEPERATION 09/27/2022 1.22  cm Final    TDI LATERAL 09/27/2022 0.03  m/s Final    PV PEAK VELOCITY 09/27/2022 1.25  cm/s Final    LVIDd 09/27/2022 2.85 (A)  3.5 - 6.0 cm Final    IVS 09/27/2022 1.40 (A)  0.6 - 1.1 cm Final    Posterior Wall 09/27/2022 1.30  0.6 - 1.1 cm Final    Ao root annulus 09/27/2022 2.08  cm Final    LVIDs 09/27/2022 2.14  2.1 - 4.0 cm Final    FS 09/27/2022 25  28 - 44 % Final    Sinus 09/27/2022 2.86  cm Final    STJ 09/27/2022 2.70  cm Final    LV mass 09/27/2022 123.57  g Final    LA size 09/27/2022 4.60  cm Final    Left Ventricle Relative Wall Thick* 09/27/2022 0.91  cm Final    AV mean gradient 09/27/2022 5  mmHg Final    AV valve area 09/27/2022 2.08  cm2 Final    AV Velocity Ratio 09/27/2022 0.76   Final    AV index (prosthetic) 09/27/2022 0.89   Final    MV mean gradient 09/27/2022 6  mmHg Final    MV valve area p 1/2 method " 09/27/2022 3.97  cm2 Final    MV valve area by continuity eq 09/27/2022 1.04  cm2 Final    E/A ratio 09/27/2022 0.52   Final    Mean e' 09/27/2022 0.03  m/s Final    E wave deceleration time 09/27/2022 191.07  msec Final    LVOT diameter 09/27/2022 1.73  cm Final    LVOT area 09/27/2022 2.3  cm2 Final    LVOT peak samy 09/27/2022 1.20  m/s Final    LVOT peak VTI 09/27/2022 20.90  cm Final    Ao peak samy 09/27/2022 1.58  m/s Final    Ao VTI 09/27/2022 23.6  cm Final    Mr max samy 09/27/2022 4.86  m/s Final    LVOT stroke volume 09/27/2022 49.10  cm3 Final    AV peak gradient 09/27/2022 10  mmHg Final    MV peak gradient 09/27/2022 17  mmHg Final    E/E' ratio 09/27/2022 42.00  m/s Final    MV Peak E Samy 09/27/2022 1.05  m/s Final    TR Max Samy 09/27/2022 2.72  m/s Final    MV VTI 09/27/2022 47.4  cm Final    MV stenosis pressure 1/2 time 09/27/2022 55.41  ms Final    MV Peak A Samy 09/27/2022 2.01  m/s Final    LV Systolic Volume 09/27/2022 15.16  mL Final    LV Systolic Volume Index 09/27/2022 10.4  mL/m2 Final    LV Diastolic Volume 09/27/2022 30.95  mL Final    LV Diastolic Volume Index 09/27/2022 21.20  mL/m2 Final    LV Mass Index 09/27/2022 85  g/m2 Final    Left Atrium Minor Axis 09/27/2022 4.50  cm Final    Left Atrium Major Axis 09/27/2022 5.22  cm Final    Triscuspid Valve Regurgitation Pea* 09/27/2022 30  mmHg Final    LA Volume Index (Mod) 09/27/2022 45.0  mL/m2 Final    LA volume (mod) 09/27/2022 65.66  cm3 Final    Right Atrial Pressure (from IVC) 09/27/2022 3  mmHg Final    EF 09/27/2022 75  % Final    RVDD 09/27/2022 2.60  cm Final    TV rest pulmonary artery pressure 09/27/2022 33  mmHg Final    Troponin I 09/26/2022 0.072 (H)  0.000 - 0.026 ng/mL Final    CPK 09/26/2022 696 (H)  20 - 180 U/L Final    Sodium 09/27/2022 134 (L)  136 - 145 mmol/L Final    Potassium 09/27/2022 3.3 (L)  3.5 - 5.1 mmol/L Final    Chloride 09/27/2022 96  95 - 110 mmol/L Final    CO2 09/27/2022 22 (L)  23 - 29 mmol/L Final     Glucose 09/27/2022 120 (H)  70 - 110 mg/dL Final    BUN 09/27/2022 14  10 - 30 mg/dL Final    Creatinine 09/27/2022 0.8  0.5 - 1.4 mg/dL Final    Calcium 09/27/2022 9.3  8.7 - 10.5 mg/dL Final    Anion Gap 09/27/2022 16  8 - 16 mmol/L Final    eGFR 09/27/2022 >60.0  >60 mL/min/1.73 m^2 Final    Sodium 09/28/2022 130 (L)  136 - 145 mmol/L Final    Potassium 09/28/2022 3.8  3.5 - 5.1 mmol/L Final    Chloride 09/28/2022 94 (L)  95 - 110 mmol/L Final    CO2 09/28/2022 21 (L)  23 - 29 mmol/L Final    Glucose 09/28/2022 98  70 - 110 mg/dL Final    BUN 09/28/2022 30  10 - 30 mg/dL Final    Creatinine 09/28/2022 1.1  0.5 - 1.4 mg/dL Final    Calcium 09/28/2022 9.3  8.7 - 10.5 mg/dL Final    Anion Gap 09/28/2022 15  8 - 16 mmol/L Final    eGFR 09/28/2022 47.1 (A)  >60 mL/min/1.73 m^2 Final    CPK 09/28/2022 717 (H)  20 - 180 U/L Final    CPK 09/29/2022 776 (H)  20 - 180 U/L Final    WBC 09/29/2022 6.16  3.90 - 12.70 K/uL Final    RBC 09/29/2022 4.30  4.00 - 5.40 M/uL Final    Hemoglobin 09/29/2022 13.4  12.0 - 16.0 g/dL Final    Hematocrit 09/29/2022 41.5  37.0 - 48.5 % Final    MCV 09/29/2022 97  82 - 98 fL Final    MCH 09/29/2022 31.2 (H)  27.0 - 31.0 pg Final    MCHC 09/29/2022 32.3  32.0 - 36.0 g/dL Final    RDW 09/29/2022 12.9  11.5 - 14.5 % Final    Platelets 09/29/2022 321  150 - 450 K/uL Final    MPV 09/29/2022 9.3  9.2 - 12.9 fL Final    Immature Granulocytes 09/29/2022 0.5  0.0 - 0.5 % Final    Gran # (ANC) 09/29/2022 4.2  1.8 - 7.7 K/uL Final    Immature Grans (Abs) 09/29/2022 0.03  0.00 - 0.04 K/uL Final    Lymph # 09/29/2022 1.0  1.0 - 4.8 K/uL Final    Mono # 09/29/2022 0.7  0.3 - 1.0 K/uL Final    Eos # 09/29/2022 0.1  0.0 - 0.5 K/uL Final    Baso # 09/29/2022 0.04  0.00 - 0.20 K/uL Final    nRBC 09/29/2022 0  0 /100 WBC Final    Gran % 09/29/2022 68.9  38.0 - 73.0 % Final    Lymph % 09/29/2022 16.7 (L)  18.0 - 48.0 % Final    Mono % 09/29/2022 11.0  4.0 - 15.0 % Final    Eosinophil % 09/29/2022 2.3  0.0 - 8.0  % Final    Basophil % 09/29/2022 0.6  0.0 - 1.9 % Final    Differential Method 09/29/2022 Automated   Final    Sodium 09/29/2022 131 (L)  136 - 145 mmol/L Final    Potassium 09/29/2022 3.7  3.5 - 5.1 mmol/L Final    Chloride 09/29/2022 99  95 - 110 mmol/L Final    CO2 09/29/2022 21 (L)  23 - 29 mmol/L Final    Glucose 09/29/2022 97  70 - 110 mg/dL Final    BUN 09/29/2022 25  10 - 30 mg/dL Final    Creatinine 09/29/2022 0.8  0.5 - 1.4 mg/dL Final    Calcium 09/29/2022 8.5 (L)  8.7 - 10.5 mg/dL Final    Total Protein 09/29/2022 6.6  6.0 - 8.4 g/dL Final    Albumin 09/29/2022 3.3 (L)  3.5 - 5.2 g/dL Final    Total Bilirubin 09/29/2022 0.4  0.1 - 1.0 mg/dL Final    Alkaline Phosphatase 09/29/2022 65  55 - 135 U/L Final    AST 09/29/2022 57 (H)  10 - 40 U/L Final    ALT 09/29/2022 27  10 - 44 U/L Final    Anion Gap 09/29/2022 11  8 - 16 mmol/L Final    eGFR 09/29/2022 >60.0  >60 mL/min/1.73 m^2 Final    SARS-CoV-2 RNA, Amplification, Qual 09/29/2022 Negative  Negative Final   Admission on 09/26/2022, Discharged on 09/26/2022   Component Date Value Ref Range Status    WBC 09/26/2022 10.33  3.90 - 12.70 K/uL Final    RBC 09/26/2022 4.14  4.00 - 5.40 M/uL Final    Hemoglobin 09/26/2022 12.9  12.0 - 16.0 g/dL Final    Hematocrit 09/26/2022 39.9  37.0 - 48.5 % Final    MCV 09/26/2022 96  82 - 98 fL Final    MCH 09/26/2022 31.2 (H)  27.0 - 31.0 pg Final    MCHC 09/26/2022 32.3  32.0 - 36.0 g/dL Final    RDW 09/26/2022 13.0  11.5 - 14.5 % Final    Platelets 09/26/2022 300  150 - 450 K/uL Final    MPV 09/26/2022 9.4  9.2 - 12.9 fL Final    Immature Granulocytes 09/26/2022 0.3  0.0 - 0.5 % Final    Gran # (ANC) 09/26/2022 8.3 (H)  1.8 - 7.7 K/uL Final    Immature Grans (Abs) 09/26/2022 0.03  0.00 - 0.04 K/uL Final    Lymph # 09/26/2022 1.1  1.0 - 4.8 K/uL Final    Mono # 09/26/2022 0.9  0.3 - 1.0 K/uL Final    Eos # 09/26/2022 0.0  0.0 - 0.5 K/uL Final    Baso # 09/26/2022 0.05  0.00 - 0.20 K/uL Final    nRBC 09/26/2022 0  0 /100  WBC Final    Gran % 09/26/2022 79.8 (H)  38.0 - 73.0 % Final    Lymph % 09/26/2022 10.5 (L)  18.0 - 48.0 % Final    Mono % 09/26/2022 8.7  4.0 - 15.0 % Final    Eosinophil % 09/26/2022 0.2  0.0 - 8.0 % Final    Basophil % 09/26/2022 0.5  0.0 - 1.9 % Final    Differential Method 09/26/2022 Automated   Final    BNP 09/26/2022 358 (H)  0 - 99 pg/mL Final    Troponin I 09/26/2022 0.093 (H)  0.000 - 0.026 ng/mL Final    Specimen UA 09/26/2022 Urine, Clean Catch   Final    Color, UA 09/26/2022 Yellow  Yellow, Straw, Amira Final    Appearance, UA 09/26/2022 Clear  Clear Final    pH, UA 09/26/2022 7.0  5.0 - 8.0 Final    Specific Gravity, UA 09/26/2022 1.010  1.005 - 1.030 Final    Protein, UA 09/26/2022 Negative  Negative Final    Glucose, UA 09/26/2022 Negative  Negative Final    Ketones, UA 09/26/2022 Negative  Negative Final    Bilirubin (UA) 09/26/2022 Negative  Negative Final    Occult Blood UA 09/26/2022 Trace (A)  Negative Final    Nitrite, UA 09/26/2022 Negative  Negative Final    Leukocytes, UA 09/26/2022 Trace (A)  Negative Final    RBC, UA 09/26/2022 0  0 - 4 /hpf Final    WBC, UA 09/26/2022 2  0 - 5 /hpf Final    Bacteria 09/26/2022 Many (A)  None-Occ /hpf Final    Squam Epithel, UA 09/26/2022 1  /hpf Final    Microscopic Comment 09/26/2022 SEE COMMENT   Final    Sodium 09/26/2022 133 (L)  136 - 145 mmol/L Final    Potassium 09/26/2022 3.7  3.5 - 5.1 mmol/L Final    Chloride 09/26/2022 99  95 - 110 mmol/L Final    CO2 09/26/2022 24  23 - 29 mmol/L Final    Glucose 09/26/2022 90  70 - 110 mg/dL Final    BUN 09/26/2022 17  10 - 30 mg/dL Final    Creatinine 09/26/2022 0.7  0.5 - 1.4 mg/dL Final    Calcium 09/26/2022 9.2  8.7 - 10.5 mg/dL Final    Total Protein 09/26/2022 7.2  6.0 - 8.4 g/dL Final    Albumin 09/26/2022 3.8  3.5 - 5.2 g/dL Final    Total Bilirubin 09/26/2022 0.5  0.1 - 1.0 mg/dL Final    Alkaline Phosphatase 09/26/2022 77  55 - 135 U/L Final    AST 09/26/2022 46 (H)  10 - 40 U/L Final    ALT  09/26/2022 20  10 - 44 U/L Final    Anion Gap 09/26/2022 10  8 - 16 mmol/L Final    eGFR 09/26/2022 >60.0  >60 mL/min/1.73 m^2 Final    Troponin I 09/26/2022 0.057 (H)  0.000 - 0.026 ng/mL Final           Imaging:   Results for orders placed or performed during the hospital encounter of 09/26/22   CT Head Without Contrast    Narrative    EXAMINATION:  CT HEAD WITHOUT CONTRAST    CLINICAL HISTORY:  Head trauma, intracranial arterial injury suspected;    TECHNIQUE:  Low dose axial CT images obtained throughout the head without the use of intravenous contrast.  Axial, sagittal and coronal reconstructions were performed.    COMPARISON:  None.    FINDINGS:  Intracranial compartment:    Pattern of  cerebral volume loss, without evidence of hydrocephalus.    Mild patchy hypoattenuation in the supratentorial white matter, nonspecific but most likely reflecting chronic small vessel ischemic changes. No recent or remote major vascular distribution infarct. No acute hemorrhage.  No mass effect or midline shift.    No extra-axial blood or fluid collections.    Skull/extracranial contents (limited evaluation):    No displaced calvarial fracture.    The mastoid air cells and visualized paranasal sinuses are essentially clear.      Impression    No evidence of acute intracranial hemorrhage.      Electronically signed by: Jon Landaverde MD  Date:    09/26/2022  Time:    15:46         Assessment and Plan     Alzheimer's dementia with behavioral disturbance      Medical Decision Making:    Continue using SLICK method for pills.   If swallowing issues arise with food/liquids, let me or PCP know.     Dr Wills met with them together. Reviewed diagnosis, progression, and expectations. Discussed possible medication options for memory including rationale and poss SE. They opt to defer memory medications. I agree.     Questions answered.     Follow up 6 mos.        I spent 75 minutes face-to-face with the patient, sons and daughter but  this was spent with Dr. Wills as well for a total time of 37 minutes with >50% of the time spent with counseling and education regarding:  - results of data, diagnosis, and recommendations stated above  - the prognosis of dementia  - risks and benefits of memory medications  - importance of diet and exercise    Naida Colvin, YVONNE, NP-C  Division of Movement and Memory Disorders  [facility]  [phone]

## 2022-11-17 ENCOUNTER — OUTPATIENT CASE MANAGEMENT (OUTPATIENT)
Dept: NEUROLOGY | Facility: CLINIC | Age: 87
End: 2022-11-17
Payer: MEDICAID

## 2022-11-17 ENCOUNTER — OFFICE VISIT (OUTPATIENT)
Dept: NEUROLOGY | Facility: CLINIC | Age: 87
End: 2022-11-17
Payer: MEDICARE

## 2022-11-17 VITALS
DIASTOLIC BLOOD PRESSURE: 78 MMHG | WEIGHT: 116.38 LBS | BODY MASS INDEX: 23.46 KG/M2 | SYSTOLIC BLOOD PRESSURE: 150 MMHG | HEIGHT: 59 IN | HEART RATE: 75 BPM

## 2022-11-17 DIAGNOSIS — G30.9 ALZHEIMER'S DEMENTIA WITH BEHAVIORAL DISTURBANCE: Primary | ICD-10-CM

## 2022-11-17 DIAGNOSIS — F02.818 ALZHEIMER'S DEMENTIA WITH BEHAVIORAL DISTURBANCE: Primary | ICD-10-CM

## 2022-11-17 PROCEDURE — 99497 ADVNCD CARE PLAN 30 MIN: CPT | Mod: PBBFAC | Performed by: INTERNAL MEDICINE

## 2022-11-17 PROCEDURE — 99499 NO LOS: ICD-10-PCS | Mod: S$PBB,,, | Performed by: PSYCHIATRY & NEUROLOGY

## 2022-11-17 PROCEDURE — 99497 ADVNCD CARE PLAN 30 MIN: CPT | Mod: S$PBB,,, | Performed by: INTERNAL MEDICINE

## 2022-11-17 PROCEDURE — 99497 PR ADVNCD CARE PLAN 30 MIN: ICD-10-PCS | Mod: S$PBB,,, | Performed by: INTERNAL MEDICINE

## 2022-11-17 PROCEDURE — 99499 UNLISTED E&M SERVICE: CPT | Mod: S$PBB,,, | Performed by: PSYCHIATRY & NEUROLOGY

## 2022-11-17 PROCEDURE — 90847 FAMILY PSYTX W/PT 50 MIN: CPT | Mod: ,,, | Performed by: PSYCHIATRY & NEUROLOGY

## 2022-11-17 PROCEDURE — 99214 OFFICE O/P EST MOD 30 MIN: CPT | Mod: S$PBB,,, | Performed by: NURSE PRACTITIONER

## 2022-11-17 PROCEDURE — 99213 OFFICE O/P EST LOW 20 MIN: CPT | Mod: PBBFAC,25

## 2022-11-17 PROCEDURE — 90847 PR FAMILY PSYCHOTHERAPY W/ PT, 50 MIN: ICD-10-PCS | Mod: ,,, | Performed by: PSYCHIATRY & NEUROLOGY

## 2022-11-17 PROCEDURE — 99214 PR OFFICE/OUTPT VISIT, EST, LEVL IV, 30-39 MIN: ICD-10-PCS | Mod: S$PBB,,, | Performed by: NURSE PRACTITIONER

## 2022-11-17 PROCEDURE — 99999 PR PBB SHADOW E&M-EST. PATIENT-LVL III: ICD-10-PCS | Mod: PBBFAC,,,

## 2022-11-17 PROCEDURE — 99999 PR PBB SHADOW E&M-EST. PATIENT-LVL III: CPT | Mod: PBBFAC,,,
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
## 2022-11-17 NOTE — PROGRESS NOTES
Advance Care Planning     Date: 2022    Today a meeting took place: other (conference room) 8th floor with Patient, shayla Simeon Jr., daughter Juliette, Maribel CROWE.    Patient Participation: Patient is able to participate     Attendees (Name and  Relationship to patient): Patient, shayla Simeon Jr., nelly Segovia    Staff attendees (Name and  Role): Elda Rubio MD , Maribel CROWE.    ACP Conversation (General): Understanding of advance care planning and role of health care agent defined Patient wanted to name her 3 children as HCPOA. She also has two older daughters who are uninvolved and wish to continue that. They call mom frequently.    Code Status: DNR; status confirmed/order placed in chart     ACP Documents: Provided ACP documents and completed HCPOA and scanned into the chart. She has a LW at home and they will bring it.    Goals of care: The patient and family endorses that what is most important right now is to focus on remaining as independent as possible and quality of life, even if it means sacrificing a little time. They would want to discuss the burden/benefit of each treatment before deciding. She would not want to be resuscitated if she  in her sleep or collapsed at the dinner table. She would not want a feeding tube.    Accordingly, we have decided that the best plan to meet the patient's goals includes continuing with treatment      Recommendations/  Follow-up tasks: The patient and health care agent were provided the following recommendations to upload LW into Epic or email to us so we may upload. Copy of HCPOA paperwork given to shayla Zacarias Jr, and nelly Segovia.      Length of ACP   conversation in minutes: 20 minutes

## 2022-11-22 ENCOUNTER — OFFICE VISIT (OUTPATIENT)
Dept: INTERNAL MEDICINE | Facility: CLINIC | Age: 87
End: 2022-11-22
Payer: MEDICARE

## 2022-11-22 VITALS
BODY MASS INDEX: 23.24 KG/M2 | SYSTOLIC BLOOD PRESSURE: 118 MMHG | WEIGHT: 115.31 LBS | HEART RATE: 86 BPM | DIASTOLIC BLOOD PRESSURE: 62 MMHG | HEIGHT: 59 IN | OXYGEN SATURATION: 98 %

## 2022-11-22 DIAGNOSIS — I21.4 NSTEMI (NON-ST ELEVATED MYOCARDIAL INFARCTION): ICD-10-CM

## 2022-11-22 DIAGNOSIS — Z87.440 HISTORY OF UTI: ICD-10-CM

## 2022-11-22 DIAGNOSIS — I10 PRIMARY HYPERTENSION: ICD-10-CM

## 2022-11-22 DIAGNOSIS — G30.9 ALZHEIMER'S DEMENTIA WITH BEHAVIORAL DISTURBANCE: Primary | ICD-10-CM

## 2022-11-22 DIAGNOSIS — G47.00 INSOMNIA, UNSPECIFIED TYPE: ICD-10-CM

## 2022-11-22 DIAGNOSIS — F02.818 ALZHEIMER'S DEMENTIA WITH BEHAVIORAL DISTURBANCE: Primary | ICD-10-CM

## 2022-11-22 DIAGNOSIS — R33.9 INCOMPLETE BLADDER EMPTYING: ICD-10-CM

## 2022-11-22 PROCEDURE — 99999 PR PBB SHADOW E&M-EST. PATIENT-LVL III: CPT | Mod: PBBFAC,,, | Performed by: INTERNAL MEDICINE

## 2022-11-22 PROCEDURE — 99999 PR PBB SHADOW E&M-EST. PATIENT-LVL III: ICD-10-PCS | Mod: PBBFAC,,, | Performed by: INTERNAL MEDICINE

## 2022-11-22 PROCEDURE — 99214 PR OFFICE/OUTPT VISIT, EST, LEVL IV, 30-39 MIN: ICD-10-PCS | Mod: S$PBB,,, | Performed by: INTERNAL MEDICINE

## 2022-11-22 PROCEDURE — 99213 OFFICE O/P EST LOW 20 MIN: CPT | Mod: PBBFAC | Performed by: INTERNAL MEDICINE

## 2022-11-22 PROCEDURE — 99214 OFFICE O/P EST MOD 30 MIN: CPT | Mod: S$PBB,,, | Performed by: INTERNAL MEDICINE

## 2022-11-22 RX ORDER — OLMESARTAN MEDOXOMIL 20 MG/1
20 TABLET ORAL DAILY
Qty: 90 TABLET | Refills: 3 | Status: SHIPPED | OUTPATIENT
Start: 2022-11-22

## 2022-11-22 RX ORDER — AMLODIPINE BESYLATE 5 MG/1
5 TABLET ORAL DAILY
Qty: 90 TABLET | Refills: 3 | Status: SHIPPED | OUTPATIENT
Start: 2022-11-22

## 2022-11-22 RX ORDER — TRAZODONE HYDROCHLORIDE 50 MG/1
50 TABLET ORAL NIGHTLY
Qty: 90 TABLET | Refills: 3 | Status: SHIPPED | OUTPATIENT
Start: 2022-11-22 | End: 2023-11-22

## 2022-11-22 NOTE — PROGRESS NOTES
INTERNAL MEDICINE ESTABLISHED PATIENT VISIT NOTE    Subjective:     Chief Complaint: Follow-up       Patient ID: Jessica Kaur is a 89 y.o. female with HTN, MICHELLE, nephrolithiasis, hemorrhoids, insomnia, last seen by me 8/2022, here today for follow-up. Accompanied by daughter Aylin.     Admitted 9/26/2022-9/29/2022. Per discharge summary:    The patient lives at the assisted living facility and she has been at her usual state of health until earlier this morning when she was found on the floor of her room. She was conscious. No chest pain or shortness of breath. Her granddaughter took her to the ER for further evaluation. She had CT brain,spine CT and abdominal/pelvic CT show no fracture or disclocation.She was found to have UTI. And she was transferred her for continuation of care.    Admitted for acute urinary tract infection, physical debility and NSTEMI.  Patient was started on IV antibiotic and Cardiology was consulted.  Cardiology recommended medical management and no statin started due to elevated CPK.  Urinary tract infection was treated with Rocephin and will be discharged with ciprofloxacin.  Patient was accepted by [facility] for PT and OT least 5 times a week.  Patient will need to follow-up with PCP and cardiology for further care.    11/2022 Neuropsychology - Alzheimer's dementia. Medication for dementia deferred by family.    Today, reports doing well. Exercising at facility.     Follows with [facility] Dr. Romo. Had Interstim placed 3/2016. Per last visit, device not working well. Partial emptying occurring. Battery no longer functioning. Repeat surgery not recommended.     Past Medical History:  Past Medical History:   Diagnosis Date    Anxiety     Hypertension     Nephrolithiasis           Review of Systems:  Review of Systems   Constitutional:  Negative for chills and fever.   HENT:  Negative for congestion.    Respiratory:  Negative for cough and shortness of breath.   "  Cardiovascular:  Negative for chest pain.   Gastrointestinal:  Negative for constipation, nausea and vomiting.   Genitourinary:  Negative for hematuria and urgency.   Musculoskeletal:  Negative for falls.   Skin:  Negative for rash.   Neurological:  Negative for dizziness and loss of consciousness.     Health Maintenance:   Immunizations:   Influenza - complete  Tdap - next visit  Covid 19 - complete  HPV  Prevnar rec at 65 - next visit  Shingrix rec at 50 - next visit     Cancer Screening:  PAP: n/a  Mammogram:  n/a  Colonoscopy:  n/a  DEXA:  n/a    Objective:   /62 (BP Location: Left arm, Patient Position: Sitting, BP Method: Medium (Manual))   Pulse 86   Ht 4' 11" (1.499 m)   Wt 52.3 kg (115 lb 4.8 oz)   SpO2 98%   BMI 23.29 kg/m²        General: AAO x3, no apparent distress  HEENT: PERRL,  hard of hearing  CV: RRR, no m/r/g  Pulm: Lungs CTAB, no crackles, no wheezes  Abd: s/NT/ND +BS  Extremities: no c/c/e    Labs:       Assessment/Plan     Alzheimer's dementia with behavioral disturbance  Follows with Neuropsychiatry; defer medications, monitor    NSTEMI (non-ST elevated myocardial infarction)  Asymptomatic, defer medical management given age    History of UTI  Recent UTI, family requesting repeat UA  -     Urinalysis, Reflex to Urine Culture; Future; Expected date: 11/22/2022    Incomplete bladder emptying  Followed by [facility]; prior InterStim placement, no longer functional  Revision/replacement not recommended  -     Urinalysis, Reflex to Urine Culture; Future; Expected date: 11/22/2022    Primary hypertension  -     olmesartan (BENICAR) 20 MG tablet; Take 1 tablet (20 mg total) by mouth once daily.  Dispense: 90 tablet; Refill: 3  -     amLODIPine (NORVASC) 5 MG tablet; Take 1 tablet (5 mg total) by mouth once daily.  Dispense: 90 tablet; Refill: 3    Insomnia, unspecified type  -     traZODone (DESYREL) 50 MG tablet; Take 1 tablet (50 mg total) by mouth every evening.  Dispense: 90 tablet; " Refill: 3    HM as above.    Jennifer Sandoval MD  Department of Internal Medicine - [facility]  01/11/2023

## 2022-11-23 ENCOUNTER — LAB VISIT (OUTPATIENT)
Dept: LAB | Facility: HOSPITAL | Age: 87
End: 2022-11-23
Payer: MEDICARE

## 2022-11-23 DIAGNOSIS — Z87.440 HISTORY OF UTI: ICD-10-CM

## 2022-11-23 DIAGNOSIS — R33.9 INCOMPLETE BLADDER EMPTYING: ICD-10-CM

## 2022-11-23 LAB
AMORPH CRY UR QL COMP ASSIST: ABNORMAL
BACTERIA #/AREA URNS AUTO: ABNORMAL /HPF
BILIRUB UR QL STRIP: NEGATIVE
CAOX CRY UR QL COMP ASSIST: ABNORMAL
CLARITY UR REFRACT.AUTO: CLEAR
COLOR UR AUTO: YELLOW
GLUCOSE UR QL STRIP: NEGATIVE
HGB UR QL STRIP: NEGATIVE
HYALINE CASTS UR QL AUTO: 6 /LPF
KETONES UR QL STRIP: NEGATIVE
LEUKOCYTE ESTERASE UR QL STRIP: ABNORMAL
MICROSCOPIC COMMENT: ABNORMAL
NITRITE UR QL STRIP: NEGATIVE
NON-SQ EPI CELLS #/AREA URNS AUTO: 5 /HPF
PH UR STRIP: 7 [PH] (ref 5–8)
PROT UR QL STRIP: NEGATIVE
RBC #/AREA URNS AUTO: 1 /HPF (ref 0–4)
SP GR UR STRIP: 1.01 (ref 1–1.03)
SQUAMOUS #/AREA URNS AUTO: 8 /HPF
URN SPEC COLLECT METH UR: ABNORMAL
WBC #/AREA URNS AUTO: 18 /HPF (ref 0–5)

## 2022-11-23 PROCEDURE — 87086 URINE CULTURE/COLONY COUNT: CPT | Performed by: INTERNAL MEDICINE

## 2022-11-23 PROCEDURE — 81001 URINALYSIS AUTO W/SCOPE: CPT | Performed by: INTERNAL MEDICINE

## 2022-11-24 LAB — BACTERIA UR CULT: NO GROWTH

## 2022-11-29 ENCOUNTER — OUTPATIENT CASE MANAGEMENT (OUTPATIENT)
Dept: NEUROLOGY | Facility: CLINIC | Age: 87
End: 2022-11-29
Payer: MEDICARE

## 2022-11-29 ENCOUNTER — PATIENT MESSAGE (OUTPATIENT)
Dept: INTERNAL MEDICINE | Facility: CLINIC | Age: 87
End: 2022-11-29
Payer: MEDICARE

## 2022-11-29 NOTE — PROGRESS NOTES
Emailed the following to caregiver, daughter Juliette:  Shirley for Dementia Caregiver Support Group  [employer] for Dementia Education Series  [place] to in-person Dementia Caregiver Support Group  Link to search for Alzheimer's Association virtual groups  [employer] for Dementia Caregivers Skills Group  [place] for Finding Meaning and Hope Discussion Series  Two general Tip Sheets  Holiday Tip Sheet  [employer]z [employer] website  FCA website  Teepa Snow video info  Teepa Snow podcast site  Book recommendations and offers of complimentary copies

## 2022-11-29 NOTE — TELEPHONE ENCOUNTER
Associate of pt (unsure relation) is asking about recent urinalysis and microscopic urinalysis     Please advise

## 2022-11-30 ENCOUNTER — PATIENT MESSAGE (OUTPATIENT)
Dept: INTERNAL MEDICINE | Facility: CLINIC | Age: 87
End: 2022-11-30
Payer: MEDICARE

## 2022-11-30 DIAGNOSIS — R82.90 ABNORMAL URINALYSIS: Primary | ICD-10-CM

## 2022-12-01 ENCOUNTER — PATIENT MESSAGE (OUTPATIENT)
Dept: INTERNAL MEDICINE | Facility: CLINIC | Age: 87
End: 2022-12-01
Payer: MEDICARE

## 2022-12-01 DIAGNOSIS — Z01.00 ENCOUNTER FOR COMPLETE EYE EXAM: Primary | ICD-10-CM

## 2023-01-02 ENCOUNTER — HOSPITAL ENCOUNTER (OUTPATIENT)
Facility: HOSPITAL | Age: 88
Discharge: HOME | End: 2023-01-02
Attending: EMERGENCY MEDICINE
Payer: MEDICARE

## 2023-01-02 VITALS
WEIGHT: 115.31 LBS | HEART RATE: 69 BPM | HEIGHT: 59 IN | DIASTOLIC BLOOD PRESSURE: 70 MMHG | OXYGEN SATURATION: 98 % | TEMPERATURE: 98 F | BODY MASS INDEX: 23.24 KG/M2 | RESPIRATION RATE: 16 BRPM | SYSTOLIC BLOOD PRESSURE: 146 MMHG

## 2023-01-02 DIAGNOSIS — S01.01XA LACERATION OF SCALP WITHOUT FOREIGN BODY, INITIAL ENCOUNTER: Primary | ICD-10-CM

## 2023-01-02 DIAGNOSIS — R55 NEAR SYNCOPE: ICD-10-CM

## 2023-01-02 DIAGNOSIS — R42 LIGHTHEADEDNESS: ICD-10-CM

## 2023-01-02 PROBLEM — N39.0 ACUTE UTI: Status: RESOLVED | Noted: 2022-09-27 | Resolved: 2023-01-02

## 2023-01-02 LAB
ALBUMIN SERPL BCP-MCNC: 3.6 G/DL (ref 3.5–5.2)
ALP SERPL-CCNC: 80 U/L (ref 55–135)
ALT SERPL W/O P-5'-P-CCNC: 11 U/L (ref 10–44)
AMORPH CRY UR QL COMP ASSIST: ABNORMAL
ANION GAP SERPL CALC-SCNC: 11 MMOL/L (ref 8–16)
AST SERPL-CCNC: 22 U/L (ref 10–40)
BACTERIA #/AREA URNS AUTO: ABNORMAL /HPF
BASOPHILS # BLD AUTO: 0.06 K/UL (ref 0–0.2)
BASOPHILS NFR BLD: 0.9 % (ref 0–1.9)
BILIRUB SERPL-MCNC: 0.7 MG/DL (ref 0.1–1)
BILIRUB UR QL STRIP: NEGATIVE
BUN SERPL-MCNC: 16 MG/DL (ref 10–30)
CALCIUM SERPL-MCNC: 9.2 MG/DL (ref 8.7–10.5)
CAOX CRY UR QL COMP ASSIST: ABNORMAL
CHLORIDE SERPL-SCNC: 100 MMOL/L (ref 95–110)
CLARITY UR REFRACT.AUTO: ABNORMAL
CO2 SERPL-SCNC: 25 MMOL/L (ref 23–29)
COLOR UR AUTO: YELLOW
CREAT SERPL-MCNC: 0.8 MG/DL (ref 0.5–1.4)
DIFFERENTIAL METHOD: ABNORMAL
EOSINOPHIL # BLD AUTO: 0.1 K/UL (ref 0–0.5)
EOSINOPHIL NFR BLD: 0.8 % (ref 0–8)
ERYTHROCYTE [DISTWIDTH] IN BLOOD BY AUTOMATED COUNT: 12.3 % (ref 11.5–14.5)
EST. GFR  (NO RACE VARIABLE): >60 ML/MIN/1.73 M^2
GLUCOSE SERPL-MCNC: 102 MG/DL (ref 70–110)
GLUCOSE UR QL STRIP: NEGATIVE
HCT VFR BLD AUTO: 38.5 % (ref 37–48.5)
HGB BLD-MCNC: 12.8 G/DL (ref 12–16)
HGB UR QL STRIP: NEGATIVE
IMM GRANULOCYTES # BLD AUTO: 0.01 K/UL (ref 0–0.04)
IMM GRANULOCYTES NFR BLD AUTO: 0.2 % (ref 0–0.5)
KETONES UR QL STRIP: NEGATIVE
LEUKOCYTE ESTERASE UR QL STRIP: ABNORMAL
LYMPHOCYTES # BLD AUTO: 0.7 K/UL (ref 1–4.8)
LYMPHOCYTES NFR BLD: 11.5 % (ref 18–48)
MCH RBC QN AUTO: 31.5 PG (ref 27–31)
MCHC RBC AUTO-ENTMCNC: 33.2 G/DL (ref 32–36)
MCV RBC AUTO: 95 FL (ref 82–98)
MICROSCOPIC COMMENT: ABNORMAL
MONOCYTES # BLD AUTO: 0.6 K/UL (ref 0.3–1)
MONOCYTES NFR BLD: 9.2 % (ref 4–15)
NEUTROPHILS # BLD AUTO: 4.9 K/UL (ref 1.8–7.7)
NEUTROPHILS NFR BLD: 77.4 % (ref 38–73)
NITRITE UR QL STRIP: POSITIVE
NON-SQ EPI CELLS #/AREA URNS AUTO: 1 /HPF
NRBC BLD-RTO: 0 /100 WBC
PH UR STRIP: 7 [PH] (ref 5–8)
PLATELET # BLD AUTO: 268 K/UL (ref 150–450)
PMV BLD AUTO: 9.5 FL (ref 9.2–12.9)
POTASSIUM SERPL-SCNC: 3.3 MMOL/L (ref 3.5–5.1)
PROT SERPL-MCNC: 6.9 G/DL (ref 6–8.4)
PROT UR QL STRIP: NEGATIVE
RBC # BLD AUTO: 4.06 M/UL (ref 4–5.4)
SODIUM SERPL-SCNC: 136 MMOL/L (ref 136–145)
SP GR UR STRIP: 1.01 (ref 1–1.03)
SQUAMOUS #/AREA URNS AUTO: 3 /HPF
TROPONIN I SERPL DL<=0.01 NG/ML-MCNC: <0.006 NG/ML (ref 0–0.03)
URN SPEC COLLECT METH UR: ABNORMAL
WBC # BLD AUTO: 6.33 K/UL (ref 3.9–12.7)
WBC #/AREA URNS AUTO: 4 /HPF (ref 0–5)

## 2023-01-02 PROCEDURE — 93010 ELECTROCARDIOGRAM REPORT: CPT | Mod: ,,, | Performed by: INTERNAL MEDICINE

## 2023-01-02 PROCEDURE — 80053 COMPREHEN METABOLIC PANEL: CPT

## 2023-01-02 PROCEDURE — 99284 PR EMERGENCY DEPT VISIT,LEVEL IV: ICD-10-PCS | Mod: 25,GC,, | Performed by: EMERGENCY MEDICINE

## 2023-01-02 PROCEDURE — 85025 COMPLETE CBC W/AUTO DIFF WBC: CPT

## 2023-01-02 PROCEDURE — 93010 EKG 12-LEAD: ICD-10-PCS | Mod: ,,, | Performed by: INTERNAL MEDICINE

## 2023-01-02 PROCEDURE — 25000003 PHARM REV CODE 250: Performed by: STUDENT IN AN ORGANIZED HEALTH CARE EDUCATION/TRAINING PROGRAM

## 2023-01-02 PROCEDURE — 90471 IMMUNIZATION ADMIN: CPT

## 2023-01-02 PROCEDURE — 12001 PR RESUPERF WND BODY <2.5CM: ICD-10-PCS | Mod: ,,, | Performed by: EMERGENCY MEDICINE

## 2023-01-02 PROCEDURE — 90715 TDAP VACCINE 7 YRS/> IM: CPT

## 2023-01-02 PROCEDURE — G0378 HOSPITAL OBSERVATION PER HR: HCPCS

## 2023-01-02 PROCEDURE — 12001 RPR S/N/AX/GEN/TRNK 2.5CM/<: CPT | Mod: ,,, | Performed by: EMERGENCY MEDICINE

## 2023-01-02 PROCEDURE — 12001 RPR S/N/AX/GEN/TRNK 2.5CM/<: CPT

## 2023-01-02 PROCEDURE — 93005 ELECTROCARDIOGRAM TRACING: CPT | Mod: 59

## 2023-01-02 PROCEDURE — 99284 EMERGENCY DEPT VISIT MOD MDM: CPT | Mod: 25,GC,, | Performed by: EMERGENCY MEDICINE

## 2023-01-02 PROCEDURE — 84484 ASSAY OF TROPONIN QUANT: CPT

## 2023-01-02 PROCEDURE — 63600175 PHARM REV CODE 636 W HCPCS

## 2023-01-02 PROCEDURE — 25000003 PHARM REV CODE 250

## 2023-01-02 PROCEDURE — 81001 URINALYSIS AUTO W/SCOPE: CPT

## 2023-01-02 PROCEDURE — 99285 EMERGENCY DEPT VISIT HI MDM: CPT | Mod: 25

## 2023-01-02 RX ORDER — POTASSIUM CHLORIDE 20 MEQ/1
40 TABLET, EXTENDED RELEASE ORAL ONCE
Status: COMPLETED | OUTPATIENT
Start: 2023-01-02 | End: 2023-01-02

## 2023-01-02 RX ORDER — ACETAMINOPHEN 500 MG
1000 TABLET ORAL
Status: COMPLETED | OUTPATIENT
Start: 2023-01-02 | End: 2023-01-02

## 2023-01-02 RX ADMIN — ACETAMINOPHEN 1000 MG: 500 TABLET ORAL at 11:01

## 2023-01-02 RX ADMIN — POTASSIUM CHLORIDE 40 MEQ: 1500 TABLET, EXTENDED RELEASE ORAL at 01:01

## 2023-01-02 RX ADMIN — TETANUS TOXOID, REDUCED DIPHTHERIA TOXOID AND ACELLULAR PERTUSSIS VACCINE, ADSORBED 0.5 ML: 5; 2.5; 8; 8; 2.5 SUSPENSION INTRAMUSCULAR at 01:01

## 2023-01-02 RX ADMIN — Medication: at 11:01

## 2023-01-02 NOTE — PLAN OF CARE
Notified by the ED about Ms. Kaur, who experienced a fall.  services were requested for observation admission, however, pt and her son refused.    ED resident made aware. Further discharge dispo per ED.      Az Ngo MD  Attending Physician  Department of Hospital Medicine  1/2/2023

## 2023-01-02 NOTE — ED TRIAGE NOTES
89 year old female with PMHx significant for HTN who presents for evaluation of head laceration following a fall this morning. Pt reports feeling lightheaded while ambulating with the use of her walker, which led to a fall. She fell backwards striking her head. No LOC. She was helped off the ground after a few minutes by assisted living staff. No fever, chills, chest pain, numbness, weakness, shortness of breath, nausea, vomiting, or diarrhea

## 2023-01-02 NOTE — ED TRIAGE NOTES
"Jessica Kaur, a 89 y.o. female presents to the ED w/ complaint of fall. Pt arrives via EMS from nursing home. Per EMS/nursing home report, pt was ambulating w/ personal assistive device approx 1 hour ago to bathroom when she "became lightheaded" and fell. Pt was able to crawl after event, required help to get up from nursing staff. Unknown LOC. Pt presents with head lac, bleeding controlled w/ EMS placed dressing. Pt denies chest pan, dizziness, HA, vision changes, peripheral numbness/tingling, recent illness, changes in bowel/bladder habits.  Son is at bedside, states she has the "beginnings" of dementia and is at her baseline. Disoriented only to year. On assessment, pt only complains of pain on chin where ems dress is located. No blood thinner use. Son remains at bedside. No cardiac hx.     Triage note:  Chief Complaint   Patient presents with    Fall     Pt arrives via EMS from [facility], after having a fall this morning. Pt hit her head large laceration noted to head, bleeding controlled by EMS. Unsure of LOC, no blood thinners.      Review of patient's allergies indicates:   Allergen Reactions    Cortisone Other (See Comments)     Increased blood pressure    Penicillins      Past Medical History:   Diagnosis Date    Anxiety     Hypertension     Nephrolithiasis        "

## 2023-01-02 NOTE — ED PROVIDER NOTES
Encounter Date: 1/2/2023       History     Chief Complaint   Patient presents with    Fall     Pt arrives via EMS from [facility], after having a fall this morning. Pt hit her head large laceration noted to head, bleeding controlled by EMS. Unsure of LOC, no blood thinners.      Pt is a 89 year old female with PMHx significant for HTN who presents for evaluation of head laceration following a fall this morning. Pt reports feeling lightheaded while ambulating with the use of her walker, which led to a fall. She fell backwards striking her head. No LOC. She was helped off the ground after a few minutes by assisted living staff. No fever, chills, chest pain, numbness, weakness, shortness of breath, nausea, vomiting, or diarrhea     The history is provided by the patient and medical records.   Review of patient's allergies indicates:   Allergen Reactions    Cortisone Other (See Comments)     Increased blood pressure    Penicillins      Past Medical History:   Diagnosis Date    Anxiety     Hypertension     Nephrolithiasis      Past Surgical History:   Procedure Laterality Date    HYSTERECTOMY      internal bladder stimulation system       Family History   Problem Relation Age of Onset    Brain cancer Mother         tumor    Colon cancer Neg Hx     Breast cancer Neg Hx     Heart attack Neg Hx      Social History     Tobacco Use    Smoking status: Never    Smokeless tobacco: Never   Substance Use Topics    Alcohol use: Never    Drug use: Never     Review of Systems   Constitutional:  Negative for chills and fever.   HENT:  Negative for ear discharge and ear pain.    Eyes:  Negative for photophobia and visual disturbance.   Respiratory:  Negative for cough and shortness of breath.    Cardiovascular:  Negative for chest pain and palpitations.   Gastrointestinal:  Negative for abdominal pain, diarrhea, nausea and vomiting.   Genitourinary:  Negative for dysuria and frequency.   Musculoskeletal:  Negative for back pain  and neck pain.   Skin:  Positive for wound. Negative for rash.   Neurological:  Negative for weakness and light-headedness.     Physical Exam     Initial Vitals [01/02/23 0820]   BP Pulse Resp Temp SpO2   (!) 142/58 74 18 97.9 °F (36.6 °C) 96 %      MAP       --         Physical Exam    Nursing note and vitals reviewed.  Constitutional: She appears well-developed and well-nourished. No distress.   HENT:   Head: Normocephalic and atraumatic.   Eyes: Conjunctivae and EOM are normal. Pupils are equal, round, and reactive to light. No scleral icterus.   Neck: Neck supple. No tracheal deviation present.   Cardiovascular:  Normal rate and regular rhythm.           No murmur heard.  Pulmonary/Chest: Breath sounds normal. No stridor. No respiratory distress. She has no wheezes. She has no rales.   Abdominal: Abdomen is soft. She exhibits no distension. There is no abdominal tenderness.   Musculoskeletal:         General: No tenderness or edema. Normal range of motion.      Cervical back: Neck supple.     Neurological: She is alert and oriented to person, place, and time. She has normal strength. No cranial nerve deficit or sensory deficit.   Skin: Skin is warm. Capillary refill takes less than 2 seconds.   Occipital scalp laceration measuring approximately 1.5 cm        ED Course   Lac Repair    Date/Time: 1/2/2023 12:30 PM  Performed by: Jr. Miguel Huddleston MD  Authorized by: Raj Pinon MD     Consent:     Consent obtained:  Verbal  Anesthesia:     Anesthesia method:  Topical application  Laceration details:     Location:  Scalp    Scalp location:  Occipital    Length (cm):  1.5  Pre-procedure details:     Preparation:  Patient was prepped and draped in usual sterile fashion  Exploration:     Hemostasis achieved with:  Direct pressure    Imaging obtained comment:  Head ct    Imaging outcome: foreign body not noted    Treatment:     Area cleansed with:  Chlorhexidine    Amount of cleaning:  Standard    Irrigation  solution:  Sterile saline  Skin repair:     Repair method:  Staples    Number of staples:  2  Repair type:     Repair type:  Simple  Post-procedure details:     Procedure completion:  Tolerated  Labs Reviewed   CBC W/ AUTO DIFFERENTIAL - Abnormal; Notable for the following components:       Result Value    MCH 31.5 (*)     Lymph # 0.7 (*)     Gran % 77.4 (*)     Lymph % 11.5 (*)     All other components within normal limits   COMPREHENSIVE METABOLIC PANEL - Abnormal; Notable for the following components:    Potassium 3.3 (*)     All other components within normal limits   URINALYSIS, REFLEX TO URINE CULTURE - Abnormal; Notable for the following components:    Appearance, UA Hazy (*)     Nitrite, UA Positive (*)     Leukocytes, UA 1+ (*)     All other components within normal limits    Narrative:     Specimen Source->Urine   URINALYSIS MICROSCOPIC - Abnormal; Notable for the following components:    Bacteria Few (*)     Non-Squam Epith 1 (*)     All other components within normal limits    Narrative:     Specimen Source->Urine   TROPONIN I     EKG Readings: (Independently Interpreted)   Initial Reading: No STEMI. Rhythm: Normal Sinus Rhythm.     Imaging Results              CT Cervical Spine Without Contrast (Final result)  Result time 01/02/23 11:01:07      Final result by Chandana Finn MD (01/02/23 11:01:07)                   Impression:      1. No acute intracranial findings.  2. Small left lateral scalp soft tissue contusion without associated skull fracture.  3. No acute cervical spine fracture.  4. Degenerative findings in the cervical spine, as discussed.      Electronically signed by: Chandana Finn  Date:    01/02/2023  Time:    11:01               Narrative:    EXAMINATION:  CT HEAD WITHOUT CONTRAST; CT CERVICAL SPINE WITHOUT CONTRAST    CLINICAL HISTORY:  fall;    TECHNIQUE:  Low dose axial images were obtained through the head and cervical spine.  Coronal and sagittal reformations were also  performed. Contrast was not administered.    COMPARISON:  CT head cervical spine performed 09/26/2022.    FINDINGS:  Head:    Blood: No acute intracranial hemorrhage.    Parenchyma: No definite loss of gray-white differentiation to suggest acute or subacute transcortical infarct. Generalized pattern of age-related volume loss.  Nonspecific areas of white matter hypoattenuation may relate to sequela of chronic small vessel ischemic disease.    Ventricles/Extra-axial spaces: No abnormal extra-axial fluid collection. Basal cisterns are patent.    Vessels: Atherosclerotic calcifications.    Orbits: Status post bilateral lens replacement    Scalp: Small left lateral scalp soft tissue contusion.    Skull: There are no depressed skull fractures or destructive bone lesions.    Sinuses and mastoids: Minor mucosal thickening and retention cysts within the paranasal sinuses.    Other findings: Soft tissue attenuation within the external auditory canals may relate to cerumen.    Cervical spine:    Fractures: No acute fractures    Alignment: There is no significant vertebral subluxation  Atlanto-axial and atlanto-occipital joints: Atlanto-axial and atlanto-occipital intervals are not widened.  Facet joints: There is no traumatic facet joint widening.  Vertebral bodies: Query osseous demineralization.  Multilevel degenerative endplate change.  Discs: Disc osteophyte complex formation.  Spinal canal and foraminal narrowing: Although CT does not optimally evaluate the soft tissue contents of the spinal canal and foramina, no critical stenosis is suggested.  At C5-6, moderate central spinal canal stenosis and severe bilateral foraminal stenosis.  Paraspinal soft tissues: Unremarkable.    Upper Lungs:Suspect atelectasis dependent left upper lobe.                                       CT Head Without Contrast (Final result)  Result time 01/02/23 11:01:07      Final result by Chandana Finn MD (01/02/23 11:01:07)                    Impression:      1. No acute intracranial findings.  2. Small left lateral scalp soft tissue contusion without associated skull fracture.  3. No acute cervical spine fracture.  4. Degenerative findings in the cervical spine, as discussed.      Electronically signed by: Chandana Finn  Date:    01/02/2023  Time:    11:01               Narrative:    EXAMINATION:  CT HEAD WITHOUT CONTRAST; CT CERVICAL SPINE WITHOUT CONTRAST    CLINICAL HISTORY:  fall;    TECHNIQUE:  Low dose axial images were obtained through the head and cervical spine.  Coronal and sagittal reformations were also performed. Contrast was not administered.    COMPARISON:  CT head cervical spine performed 09/26/2022.    FINDINGS:  Head:    Blood: No acute intracranial hemorrhage.    Parenchyma: No definite loss of gray-white differentiation to suggest acute or subacute transcortical infarct. Generalized pattern of age-related volume loss.  Nonspecific areas of white matter hypoattenuation may relate to sequela of chronic small vessel ischemic disease.    Ventricles/Extra-axial spaces: No abnormal extra-axial fluid collection. Basal cisterns are patent.    Vessels: Atherosclerotic calcifications.    Orbits: Status post bilateral lens replacement    Scalp: Small left lateral scalp soft tissue contusion.    Skull: There are no depressed skull fractures or destructive bone lesions.    Sinuses and mastoids: Minor mucosal thickening and retention cysts within the paranasal sinuses.    Other findings: Soft tissue attenuation within the external auditory canals may relate to cerumen.    Cervical spine:    Fractures: No acute fractures    Alignment: There is no significant vertebral subluxation  Atlanto-axial and atlanto-occipital joints: Atlanto-axial and atlanto-occipital intervals are not widened.  Facet joints: There is no traumatic facet joint widening.  Vertebral bodies: Query osseous demineralization.  Multilevel degenerative endplate change.  Discs: Disc  osteophyte complex formation.  Spinal canal and foraminal narrowing: Although CT does not optimally evaluate the soft tissue contents of the spinal canal and foramina, no critical stenosis is suggested.  At C5-6, moderate central spinal canal stenosis and severe bilateral foraminal stenosis.  Paraspinal soft tissues: Unremarkable.    Upper Lungs:Suspect atelectasis dependent left upper lobe.                                       X-Ray Chest 1 View (Final result)  Result time 01/02/23 11:02:59   Procedure changed from X-Ray Chest PA And Lateral     Final result by Chandana Chavarria MD (01/02/23 11:02:59)                   Impression:      No acute intrathoracic process.  Cardiac silhouette is enlarged, similar to prior exam.      Electronically signed by: Chandana Chavarria MD  Date:    01/02/2023  Time:    11:02               Narrative:    EXAMINATION:  XR CHEST 1 VIEW    CLINICAL HISTORY:  cough; Dizziness and giddiness    TECHNIQUE:  Single frontal view of the chest was performed.    COMPARISON:  Plain film from 09/27/2022    FINDINGS:  Cardiac silhouette is enlarged similar to prior exam.  Atherosclerosis of the aortic arch.  No pleural effusion.  No pneumothorax.  Lungs are well aerated.                                    X-Rays:   Independently Interpreted Readings:   Chest X-Ray: No infiltrates.  No acute abnormalities.   Medications   LETS (LIDOcaine-TETRAcaine-EPINEPHrine) gel solution ( Topical (Top) Given 1/2/23 1131)   acetaminophen tablet 1,000 mg (1,000 mg Oral Given 1/2/23 1131)   Tdap (BOOSTRIX) vaccine injection 0.5 mL (0.5 mLs Intramuscular Given 1/2/23 1347)   potassium chloride SA CR tablet 40 mEq (40 mEq Oral Given 1/2/23 1346)     Medical Decision Making:   History:   Old Medical Records: I decided to obtain old medical records.  Initial Assessment:   Pt presents following a fall with head trauma that was preceded by lightheadedness   Differential Diagnosis:   ACS, arrhythmia, electrolyte  abnormality, anemia, UTI, intracranial hemorrhage, SDH, EDH, pneumonia   Independently Interpreted Test(s):   I have ordered and independently interpreted X-rays - see prior notes.  I have ordered and independently interpreted EKG Reading(s) - see prior notes  Clinical Tests:   Lab Tests: Ordered and Reviewed  Radiological Study: Ordered and Reviewed  Medical Tests: Ordered and Reviewed  ED Management:  EKG without evidence of ischemia or arrhythmia. CXR without evidence of pneumonia. CT head and neck negative for bleed, fracture or dislocation. Pt's laceration repaired without complication. Discussed with pt and son about admission for continued evaluation of near syncope. They would like to be discharged to home. Shared decision making made and pt will be discharged to home to be monitored by family. Return precautions advised           Attending Attestation:   Physician Attestation Statement for Resident:  As the supervising MD   Physician Attestation Statement: I have personally seen and examined this patient.   I agree with the above history.  -:   As the supervising MD I agree with the above PE.     As the supervising MD I agree with the above treatment, course, plan, and disposition.   -: Fall, unclear etiology, head lac.  Repaired in ED.  Planned obs, but patient and family requested to be discharged instead.  Shared decisionmaking occurred, will d/c.    I was personally present during the critical portions of the procedure(s) performed by the resident and was immediately available in the ED to provide services and assistance as needed during the entire procedure.                ED Course as of 01/02/23 1511   Mon Jan 02, 2023   1001 WBC: 6.33 [DC]   1001 Hemoglobin: 12.8 [DC]   1001 Platelets: 268 [DC]   1002 EKG 12-lead  EKG shows normal sinus rhythm and no acute ischemia per my independent interpretation.     [DC]   1125 Creatinine: 0.8 [DC]   1126 Troponin I: <0.006 [DC]      ED Course User Index  [DC]  Raj Pinon MD                 Clinical Impression:   Final diagnoses:  [R42] Lightheadedness  [R55] Near syncope  [S01.01XA] Laceration of scalp without foreign body, initial encounter (Primary)        ED Disposition Condition    Discharge Stable          ED Prescriptions    None       Follow-up Information       Follow up With Specialties Details Why Contact Info    Jacobo Sandoval MD Internal Medicine In 1 week  [address] [facility] [address]  [phone]               Carl Huddleston MD  Resident  01/02/23 1512       Raj Pinon MD  01/03/23 [id number]

## 2023-01-02 NOTE — PLAN OF CARE
[facility] - Emergency Dept  Initial Discharge Assessment       Primary Care Provider: Jacobo Sandoval MD    Admission Diagnosis: No admission diagnoses are documented for this encounter.    Admission Date: 1/2/2023  Expected Discharge Date:     SW visited with pt and martínez Zacarias at bedside.  Address listed on facesheet is martínez Zacarias's address.  Pt lives at [facility] at [address] [place] [address] in [address].  SW added address to demographics under the 'confidential' tab.    Martínez Zacarias stated that pt is being followed by and [facility] outpatient SW/CM who assists with setting appointments for pt.      Martínez Zacarias stated that pt lives in her own suite and the facility brings pt her meds 3x a day and will help pt with showering.  Martínez Zacarias stated that pt fixes and eats her own breakfast, then has a communal lunch at the facility.      Martínez Zacarias stated that they do not require any post-acute services at this time and he will provide transportation to bring pt home when ready.      Discharge Barriers Identified: (P) None    Payor: MEDICARE / Plan: MEDICARE PART A & B / Product Type: Government /     Extended Emergency Contact Information  Primary Emergency Contact: stacy acosta  Mobile Phone: [phone]  Relation: Daughter   needed? No  Secondary Emergency Contact: Luan holguin  Mobile Phone: [phone]  Relation: Son    Discharge Plan A: (P) Home (return to assisted living facility)  Discharge Plan B: (P) Home      [facility] - [place] - [address] [place] [address]  Phone: [phone] Fax: [phone]    [id number] [place] - [address]  [address] [facility]  [place] [address]  Phone: [phone] Fax: [phone]      Initial Assessment (most recent)       Adult Discharge Assessment - 01/02/23 1022          Discharge Assessment    Assessment Type Discharge Planning Assessment  (P)      Confirmed/corrected address, phone number and insurance Yes (P)      Confirmed Demographics Correct on Facesheet (P)      Source of Information patient;family (P)      People in Home alone (P)      Facility Arrived From: [facility] (P)      Do you expect to return to your current living situation? Yes (P)      Do you have help at home or someone to help you manage your care at home? No (P)      Prior to hospitilization cognitive status: Alert/Oriented;No Deficits (P)      Current cognitive status: Alert/Oriented;No Deficits (P)      Dressing/Bathing bathing difficulty, assistance 1 person (P)      Dressing/Bathing Management has help to shower (P)      Home Accessibility wheelchair accessible (P)      Home Layout Able to live on 1st floor (P)      Equipment Currently Used at Home rollator (P)      Patient currently being followed by outpatient case management? Yes (P)      If yes, name of outpatient case management following: [facility] outpatient case management (P)      Do you currently have service(s) that help you manage your care at home? No (P)      Do you have any problems affording any of your prescribed medications? No (P)      Is the patient taking medications as prescribed? yes (P)      Who is going to help you get home at discharge? outpatient SW, sons and daughter (P)      How do you get to doctors appointments? family or friend will provide (P)      Are you on dialysis? No (P)      Do you take coumadin? No (P)      Discharge Plan A Home (P)    return to assisted living facility    Discharge Plan B Home (P)      DME Needed Upon Discharge  none (P)      Discharge Plan discussed with: Patient;Adult children (P)      Discharge Barriers Identified None (P)                         Piper Sr CD, MSW, LMSW, RSW   Case Management  [facility]  Email: [email or web address]

## 2023-01-02 NOTE — ED NOTES
Pt placed on continuous cardiac and pulse ox monitoring with blood pressure to cycle every 30 minutes.  VS stable; NSR noted.  Bed locked in lowest position; side rails up and locked x 2; call light, bedside table, and personal belongings within reach.  Pt instructed to alert nurse for assistance before attempting to get out of bed. Son remains at bedside.

## 2023-01-02 NOTE — Clinical Note
Diagnosis: Near syncope [416269]   Future Attending Provider: VASU RENTERIA [[id number]]   Admitting Provider:: VASU RENTERIA [[id number]]   Special Needs:: No Special Needs [1]

## 2023-02-20 PROBLEM — I21.4 NSTEMI (NON-ST ELEVATED MYOCARDIAL INFARCTION): Status: RESOLVED | Noted: 2022-09-27 | Resolved: 2023-02-20

## 2023-02-23 ENCOUNTER — PATIENT MESSAGE (OUTPATIENT)
Dept: INTERNAL MEDICINE | Facility: CLINIC | Age: 88
End: 2023-02-23
Payer: MEDICARE

## 2023-02-23 DIAGNOSIS — R41.0 CONFUSION: Primary | ICD-10-CM

## 2023-02-28 ENCOUNTER — OUTPATIENT CASE MANAGEMENT (OUTPATIENT)
Dept: NEUROLOGY | Facility: CLINIC | Age: 88
End: 2023-02-28
Payer: MEDICARE

## 2023-02-28 NOTE — PROGRESS NOTES
Social Work - Dementia Care Management:    Spoke on phone with dtr Juliette to update needs and address questions about options for increased care.  Currently paying for additional services at [facility] for [employer]. Son does pt's laundry, fmly hires sitter 4 hours/day on weekend for bathing. Facility provides light housekeeping and meals.  Pt calls each of her children several times per day. Pt stays in room, watches tv all day, is bored.  Dtr has called around to various facilities that can offer increased level of care, but price is out of reach.  For now, dtr states pt is getting adequate care, is safe, and she does not want to move pt to a Memory Care facility prematurely.    Provided psycho-education, strategies, resource information, brainstorming.  Provided information on options for eventual placement in Memory Care facility, including financial eligibility for Medicaid nursing home coverage.  Discussed challenge of getting pt engaged in an intermediate setting, and suggested dtr ask staff to actively prompt/encourage pt to attend activities.  Encouraged dtr to contact me as needed.

## 2023-03-16 ENCOUNTER — LAB VISIT (OUTPATIENT)
Dept: LAB | Facility: HOSPITAL | Age: 88
End: 2023-03-16
Payer: MEDICARE

## 2023-03-16 DIAGNOSIS — R41.0 CONFUSION: ICD-10-CM

## 2023-03-16 DIAGNOSIS — R82.90 ABNORMAL URINALYSIS: ICD-10-CM

## 2023-03-16 PROCEDURE — 81001 URINALYSIS AUTO W/SCOPE: CPT | Mod: 91 | Performed by: NURSE PRACTITIONER

## 2023-03-16 PROCEDURE — 81001 URINALYSIS AUTO W/SCOPE: CPT | Performed by: INTERNAL MEDICINE

## 2023-03-17 ENCOUNTER — PATIENT MESSAGE (OUTPATIENT)
Dept: INTERNAL MEDICINE | Facility: CLINIC | Age: 88
End: 2023-03-17
Payer: MEDICARE

## 2023-03-17 DIAGNOSIS — N39.0 ACUTE UTI (URINARY TRACT INFECTION): Primary | ICD-10-CM

## 2023-03-17 LAB
BACTERIA #/AREA URNS AUTO: ABNORMAL /HPF
BACTERIA #/AREA URNS AUTO: ABNORMAL /HPF
BILIRUB UR QL STRIP: NEGATIVE
BILIRUB UR QL STRIP: NEGATIVE
CLARITY UR REFRACT.AUTO: CLEAR
CLARITY UR REFRACT.AUTO: CLEAR
COLOR UR AUTO: YELLOW
COLOR UR AUTO: YELLOW
GLUCOSE UR QL STRIP: NEGATIVE
GLUCOSE UR QL STRIP: NEGATIVE
HGB UR QL STRIP: NEGATIVE
HGB UR QL STRIP: NEGATIVE
KETONES UR QL STRIP: NEGATIVE
KETONES UR QL STRIP: NEGATIVE
LEUKOCYTE ESTERASE UR QL STRIP: NEGATIVE
LEUKOCYTE ESTERASE UR QL STRIP: NEGATIVE
MICROSCOPIC COMMENT: ABNORMAL
MICROSCOPIC COMMENT: ABNORMAL
NITRITE UR QL STRIP: POSITIVE
NITRITE UR QL STRIP: POSITIVE
NON-SQ EPI CELLS #/AREA URNS AUTO: 2 /HPF
PH UR STRIP: 7 [PH] (ref 5–8)
PH UR STRIP: 7 [PH] (ref 5–8)
PROT UR QL STRIP: NEGATIVE
PROT UR QL STRIP: NEGATIVE
RBC #/AREA URNS AUTO: 1 /HPF (ref 0–4)
RBC #/AREA URNS AUTO: 1 /HPF (ref 0–4)
SP GR UR STRIP: 1.01 (ref 1–1.03)
SP GR UR STRIP: 1.01 (ref 1–1.03)
SQUAMOUS #/AREA URNS AUTO: 1 /HPF
SQUAMOUS #/AREA URNS AUTO: 1 /HPF
UNSPECIFIED CRY UR QL COMP ASSIST: 12
UNSPECIFIED CRY UR QL COMP ASSIST: 15
URN SPEC COLLECT METH UR: ABNORMAL
URN SPEC COLLECT METH UR: ABNORMAL
WBC #/AREA URNS AUTO: 3 /HPF (ref 0–5)
WBC #/AREA URNS AUTO: 4 /HPF (ref 0–5)

## 2023-03-17 RX ORDER — SULFAMETHOXAZOLE AND TRIMETHOPRIM 800; 160 MG/1; MG/1
1 TABLET ORAL 2 TIMES DAILY
Qty: 14 TABLET | Refills: 0 | Status: SHIPPED | OUTPATIENT
Start: 2023-03-17 | End: 2023-03-24

## 2023-03-20 ENCOUNTER — TELEPHONE (OUTPATIENT)
Dept: INTERNAL MEDICINE | Facility: CLINIC | Age: 88
End: 2023-03-20
Payer: MEDICARE

## 2023-04-06 ENCOUNTER — PATIENT MESSAGE (OUTPATIENT)
Dept: INTERNAL MEDICINE | Facility: CLINIC | Age: 88
End: 2023-04-06
Payer: MEDICARE

## 2023-04-06 RX ORDER — SULFAMETHOXAZOLE AND TRIMETHOPRIM 800; 160 MG/1; MG/1
1 TABLET ORAL 2 TIMES DAILY
Qty: 10 TABLET | Refills: 0 | Status: SHIPPED | OUTPATIENT
Start: 2023-04-06 | End: 2023-04-11

## 2023-04-20 ENCOUNTER — PATIENT MESSAGE (OUTPATIENT)
Dept: INTERNAL MEDICINE | Facility: CLINIC | Age: 88
End: 2023-04-20
Payer: MEDICARE

## 2023-04-20 NOTE — TELEPHONE ENCOUNTER
Please assist patient in scheduling an appointment for paperwork completion  I believe this has to be done by a physician, not PA or NP but depends on the paperwork

## 2023-04-28 ENCOUNTER — OFFICE VISIT (OUTPATIENT)
Dept: INTERNAL MEDICINE | Facility: CLINIC | Age: 88
End: 2023-04-28
Payer: MEDICARE

## 2023-04-28 VITALS
OXYGEN SATURATION: 97 % | SYSTOLIC BLOOD PRESSURE: 137 MMHG | DIASTOLIC BLOOD PRESSURE: 68 MMHG | HEART RATE: 77 BPM | HEIGHT: 60 IN | WEIGHT: 123.25 LBS | BODY MASS INDEX: 24.2 KG/M2

## 2023-04-28 DIAGNOSIS — Z20.822 ENCOUNTER FOR LABORATORY TESTING FOR COVID-19 VIRUS: ICD-10-CM

## 2023-04-28 DIAGNOSIS — Z11.1 SCREENING-PULMONARY TB: ICD-10-CM

## 2023-04-28 DIAGNOSIS — Z00.00 ANNUAL PHYSICAL EXAM: Primary | ICD-10-CM

## 2023-04-28 LAB — SARS-COV-2 RNA RESP QL NAA+PROBE: NOT DETECTED

## 2023-04-28 PROCEDURE — 99213 PR OFFICE/OUTPT VISIT, EST, LEVL III, 20-29 MIN: ICD-10-PCS | Mod: GC,S$GLB,, | Performed by: STUDENT IN AN ORGANIZED HEALTH CARE EDUCATION/TRAINING PROGRAM

## 2023-04-28 PROCEDURE — 3288F PR FALLS RISK ASSESSMENT DOCUMENTED: ICD-10-PCS | Mod: CPTII,GC,S$GLB, | Performed by: STUDENT IN AN ORGANIZED HEALTH CARE EDUCATION/TRAINING PROGRAM

## 2023-04-28 PROCEDURE — U0005 INFEC AGEN DETEC AMPLI PROBE: HCPCS | Performed by: STUDENT IN AN ORGANIZED HEALTH CARE EDUCATION/TRAINING PROGRAM

## 2023-04-28 PROCEDURE — 99999 PR PBB SHADOW E&M-EST. PATIENT-LVL III: ICD-10-PCS | Mod: PBBFAC,GC,, | Performed by: STUDENT IN AN ORGANIZED HEALTH CARE EDUCATION/TRAINING PROGRAM

## 2023-04-28 PROCEDURE — 1126F AMNT PAIN NOTED NONE PRSNT: CPT | Mod: CPTII,GC,S$GLB, | Performed by: STUDENT IN AN ORGANIZED HEALTH CARE EDUCATION/TRAINING PROGRAM

## 2023-04-28 PROCEDURE — 1126F PR PAIN SEVERITY QUANTIFIED, NO PAIN PRESENT: ICD-10-PCS | Mod: CPTII,GC,S$GLB, | Performed by: STUDENT IN AN ORGANIZED HEALTH CARE EDUCATION/TRAINING PROGRAM

## 2023-04-28 PROCEDURE — 86580 TB INTRADERMAL TEST: CPT | Mod: S$GLB,,, | Performed by: INTERNAL MEDICINE

## 2023-04-28 PROCEDURE — 1157F PR ADVANCE CARE PLAN OR EQUIV PRESENT IN MEDICAL RECORD: ICD-10-PCS | Mod: CPTII,GC,S$GLB, | Performed by: STUDENT IN AN ORGANIZED HEALTH CARE EDUCATION/TRAINING PROGRAM

## 2023-04-28 PROCEDURE — 99213 OFFICE O/P EST LOW 20 MIN: CPT | Mod: GC,S$GLB,, | Performed by: STUDENT IN AN ORGANIZED HEALTH CARE EDUCATION/TRAINING PROGRAM

## 2023-04-28 PROCEDURE — 1101F PT FALLS ASSESS-DOCD LE1/YR: CPT | Mod: CPTII,GC,S$GLB, | Performed by: STUDENT IN AN ORGANIZED HEALTH CARE EDUCATION/TRAINING PROGRAM

## 2023-04-28 PROCEDURE — 1157F ADVNC CARE PLAN IN RCRD: CPT | Mod: CPTII,GC,S$GLB, | Performed by: STUDENT IN AN ORGANIZED HEALTH CARE EDUCATION/TRAINING PROGRAM

## 2023-04-28 PROCEDURE — 3288F FALL RISK ASSESSMENT DOCD: CPT | Mod: CPTII,GC,S$GLB, | Performed by: STUDENT IN AN ORGANIZED HEALTH CARE EDUCATION/TRAINING PROGRAM

## 2023-04-28 PROCEDURE — 1101F PR PT FALLS ASSESS DOC 0-1 FALLS W/OUT INJ PAST YR: ICD-10-PCS | Mod: CPTII,GC,S$GLB, | Performed by: STUDENT IN AN ORGANIZED HEALTH CARE EDUCATION/TRAINING PROGRAM

## 2023-04-28 PROCEDURE — 99999 PR PBB SHADOW E&M-EST. PATIENT-LVL III: CPT | Mod: PBBFAC,GC,, | Performed by: STUDENT IN AN ORGANIZED HEALTH CARE EDUCATION/TRAINING PROGRAM

## 2023-04-28 PROCEDURE — 86580 POCT TB SKIN TEST: ICD-10-PCS | Mod: S$GLB,,, | Performed by: INTERNAL MEDICINE

## 2023-05-01 ENCOUNTER — PATIENT MESSAGE (OUTPATIENT)
Dept: INTERNAL MEDICINE | Facility: CLINIC | Age: 88
End: 2023-05-01

## 2023-05-01 ENCOUNTER — CLINICAL SUPPORT (OUTPATIENT)
Dept: INTERNAL MEDICINE | Facility: CLINIC | Age: 88
End: 2023-05-01

## 2023-05-01 ENCOUNTER — LAB VISIT (OUTPATIENT)
Dept: LAB | Facility: HOSPITAL | Age: 88
End: 2023-05-01
Attending: STUDENT IN AN ORGANIZED HEALTH CARE EDUCATION/TRAINING PROGRAM

## 2023-05-01 DIAGNOSIS — R35.0 FREQUENCY OF URINATION: ICD-10-CM

## 2023-05-01 DIAGNOSIS — Z11.1 SCREENING-PULMONARY TB: Primary | ICD-10-CM

## 2023-05-01 LAB
BACTERIA #/AREA URNS AUTO: ABNORMAL /HPF
BILIRUB UR QL STRIP: NEGATIVE
CAOX CRY UR QL COMP ASSIST: ABNORMAL
CLARITY UR REFRACT.AUTO: CLEAR
COLOR UR AUTO: YELLOW
GLUCOSE UR QL STRIP: NEGATIVE
HGB UR QL STRIP: NEGATIVE
HYALINE CASTS UR QL AUTO: 6 /LPF
KETONES UR QL STRIP: NEGATIVE
LEUKOCYTE ESTERASE UR QL STRIP: ABNORMAL
MICROSCOPIC COMMENT: ABNORMAL
NITRITE UR QL STRIP: POSITIVE
NON-SQ EPI CELLS #/AREA URNS AUTO: 2 /HPF
PH UR STRIP: 6 [PH] (ref 5–8)
PROT UR QL STRIP: ABNORMAL
RBC #/AREA URNS AUTO: 2 /HPF (ref 0–4)
SP GR UR STRIP: 1.02 (ref 1–1.03)
SQUAMOUS #/AREA URNS AUTO: 1 /HPF
TB INDURATION - 48 HR READ: NORMAL
TB INDURATION - 72 HR READ: 0 MM
TB SKIN TEST - 48 HR READ: NORMAL
TB SKIN TEST - 72 HR READ: NEGATIVE
URN SPEC COLLECT METH UR: ABNORMAL
WBC #/AREA URNS AUTO: 75 /HPF (ref 0–5)

## 2023-05-01 PROCEDURE — 87186 SC STD MICRODIL/AGAR DIL: CPT | Performed by: STUDENT IN AN ORGANIZED HEALTH CARE EDUCATION/TRAINING PROGRAM

## 2023-05-01 PROCEDURE — 87088 URINE BACTERIA CULTURE: CPT | Performed by: STUDENT IN AN ORGANIZED HEALTH CARE EDUCATION/TRAINING PROGRAM

## 2023-05-01 PROCEDURE — 87086 URINE CULTURE/COLONY COUNT: CPT | Performed by: STUDENT IN AN ORGANIZED HEALTH CARE EDUCATION/TRAINING PROGRAM

## 2023-05-01 PROCEDURE — 87077 CULTURE AEROBIC IDENTIFY: CPT | Performed by: STUDENT IN AN ORGANIZED HEALTH CARE EDUCATION/TRAINING PROGRAM

## 2023-05-01 PROCEDURE — 81001 URINALYSIS AUTO W/SCOPE: CPT | Performed by: STUDENT IN AN ORGANIZED HEALTH CARE EDUCATION/TRAINING PROGRAM

## 2023-05-03 ENCOUNTER — PATIENT MESSAGE (OUTPATIENT)
Dept: INTERNAL MEDICINE | Facility: CLINIC | Age: 88
End: 2023-05-03

## 2023-05-04 ENCOUNTER — TELEPHONE (OUTPATIENT)
Dept: INTERNAL MEDICINE | Facility: CLINIC | Age: 88
End: 2023-05-04

## 2023-05-04 DIAGNOSIS — N39.0 ACUTE UTI (URINARY TRACT INFECTION): Primary | ICD-10-CM

## 2023-05-04 RX ORDER — SULFAMETHOXAZOLE AND TRIMETHOPRIM 800; 160 MG/1; MG/1
1 TABLET ORAL 2 TIMES DAILY
Qty: 6 TABLET | Refills: 0 | Status: SHIPPED | OUTPATIENT
Start: 2023-05-04 | End: 2023-05-07

## 2023-05-04 NOTE — PROGRESS NOTES
Clinic Note  5/4/2023      Subjective:       Patient ID:  Jessica is a 89 y.o. female being seen for an urgent care visit.    Chief Complaint: Annual Exam (Nursing home forms)    89 y.o. female with HTN, MICHELLE, insomnia presents for physical exam prior to NH admit. Accompanied by son Rell, and by phone daughter Aylin.   The patient has been at her usual state of health.  Presents with son to fill out NH paperwork requirements.   11/2022 Neuropsychology - Alzheimer's dementia. Medication for dementia deferred by family.     Today, reports doing well. Exercising at facility.      Follows with Urology Dr. Romo. Had Interstim placed 3/2016. Per last visit, device not working well. Partial emptying occurring. Battery no longer functioning. Repeat surgery not recommended.        Past Medical History:   Diagnosis Date    Anxiety     Hypertension     Nephrolithiasis      Past Surgical History:   Procedure Laterality Date    HYSTERECTOMY      internal bladder stimulation system       Current Outpatient Medications on File Prior to Visit   Medication Sig Dispense Refill    amLODIPine (NORVASC) 5 MG tablet Take 1 tablet (5 mg total) by mouth once daily. 90 tablet 3    b complex vitamins tablet Take 1 tablet by mouth once daily.      ibuprofen (ADVIL,MOTRIN) 200 MG tablet Take 200 mg by mouth every 6 (six) hours as needed for Pain.      irbesartan (AVAPRO) 300 MG tablet TAKE 1 TABLET (300 MG TOTAL) BY MOUTH EVERY MORNING. 90 tablet 1    melatonin 3 mg Cap Take by mouth.      olmesartan (BENICAR) 20 MG tablet Take 1 tablet (20 mg total) by mouth once daily. 90 tablet 3    polyethylene glycol (GLYCOLAX) 17 gram/dose powder Take 17 g by mouth once daily.      traZODone (DESYREL) 50 MG tablet Take 1 tablet (50 mg total) by mouth every evening. 90 tablet 3     No current facility-administered medications on file prior to visit.     Review of patient's allergies indicates:   Allergen Reactions    Penicillins Anaphylaxis    Androgenic  anabolic steroid     Cortisone Other (See Comments)     Increased blood pressure     Family History   Problem Relation Age of Onset    Brain cancer Mother         tumor    Colon cancer Neg Hx     Breast cancer Neg Hx     Heart attack Neg Hx        Review of Systems   Constitutional:  Negative for chills, fever, malaise/fatigue and weight loss.   HENT:  Negative for ear pain, hearing loss, sinus pain and sore throat.    Eyes:  Negative for blurred vision.   Respiratory:  Negative for cough, sputum production, shortness of breath and wheezing.    Cardiovascular:  Negative for chest pain, orthopnea and leg swelling.   Gastrointestinal:  Negative for abdominal pain, diarrhea, heartburn, nausea and vomiting.   Genitourinary:  Negative for dysuria and urgency.   Musculoskeletal:  Negative for back pain, falls, myalgias and neck pain.   Skin:  Negative for rash.   Neurological:  Negative for dizziness, weakness and headaches.   Psychiatric/Behavioral:  Negative for depression.      Medication List with Changes/Refills   New Medications    SULFAMETHOXAZOLE-TRIMETHOPRIM 800-160MG (BACTRIM DS) 800-160 MG TAB    Take 1 tablet by mouth 2 (two) times daily. for 3 days   Current Medications    AMLODIPINE (NORVASC) 5 MG TABLET    Take 1 tablet (5 mg total) by mouth once daily.    B COMPLEX VITAMINS TABLET    Take 1 tablet by mouth once daily.    IBUPROFEN (ADVIL,MOTRIN) 200 MG TABLET    Take 200 mg by mouth every 6 (six) hours as needed for Pain.    IRBESARTAN (AVAPRO) 300 MG TABLET    TAKE 1 TABLET (300 MG TOTAL) BY MOUTH EVERY MORNING.    MELATONIN 3 MG CAP    Take by mouth.    OLMESARTAN (BENICAR) 20 MG TABLET    Take 1 tablet (20 mg total) by mouth once daily.    POLYETHYLENE GLYCOL (GLYCOLAX) 17 GRAM/DOSE POWDER    Take 17 g by mouth once daily.    TRAZODONE (DESYREL) 50 MG TABLET    Take 1 tablet (50 mg total) by mouth every evening.       Patient Active Problem List   Diagnosis    Kidney stones    MICHELLE (generalized anxiety  disorder)    Hemorrhoids    Insomnia    Hypertension    Prediabetes    Physical deconditioning    Alzheimer's dementia with behavioral disturbance           Objective:      /68 (BP Location: Left arm, Patient Position: Sitting, BP Method: Medium (Automatic))   Pulse 77   Ht 5' (1.524 m)   Wt 55.9 kg (123 lb 3.8 oz)   SpO2 97%   BMI 24.07 kg/m²   Estimated body mass index is 24.07 kg/m² as calculated from the following:    Height as of this encounter: 5' (1.524 m).    Weight as of this encounter: 55.9 kg (123 lb 3.8 oz).  Physical Exam  Constitutional:       General: She is not in acute distress.  HENT:      Head: Normocephalic and atraumatic.      Mouth/Throat:      Pharynx: No oropharyngeal exudate.   Eyes:      General: No scleral icterus.     Conjunctiva/sclera: Conjunctivae normal.      Pupils: Pupils are equal, round, and reactive to light.   Neck:      Vascular: No JVD.   Cardiovascular:      Rate and Rhythm: Normal rate and regular rhythm.      Heart sounds: Normal heart sounds. No murmur heard.  Pulmonary:      Effort: Pulmonary effort is normal. No respiratory distress.      Breath sounds: Normal breath sounds. No wheezing.   Chest:      Chest wall: No tenderness.   Abdominal:      General: Bowel sounds are normal. There is no distension.      Palpations: Abdomen is soft.      Tenderness: There is no abdominal tenderness. There is no rebound.   Musculoskeletal:         General: No tenderness. Normal range of motion.      Cervical back: Normal range of motion and neck supple.   Lymphadenopathy:      Cervical: No cervical adenopathy.   Skin:     General: Skin is warm and dry.      Findings: No erythema or rash.   Neurological:      Mental Status: She is alert and oriented to person, place, and time.         Assessment and Plan:   89 years old F with dementia, HTN presents for pre NH acceptance visit.     Pre NH physical exam   -- Physical exam as above  - TB skin test  - COVID-19 screening      Alzheimer, mixed dementia   -- follow up with neurology, diagnosis made recently   -- able to answer questions appropriately. Alert and oriented x3   -- Dependent on family for grocery shopping and driving       Follow up with PCP for rest of care         Problem List Items Addressed This Visit    None  Visit Diagnoses       Annual physical exam    -  Primary    Screening-pulmonary TB        Relevant Orders    POCT TB Skin Test (Completed)    Encounter for laboratory testing for COVID-19 virus                Follow Up:   No follow-ups on file.        Plan discussed with attending Dr. Hui , further recommendations as per attending addendum. Please feel free to call with any questions or concerns.        Florecita Giraldo MD  Internal Medicine  Resident Physician - PGY3            .

## 2023-05-04 NOTE — TELEPHONE ENCOUNTER
Discussed recent urine test results and sxs concerning for UTI. Will  order  Bactrim BID for 3 days.     All questions answered,   Florecita Giraldo MD   Internal medicine, PGY 3   [facility]

## 2023-05-05 LAB — BACTERIA UR CULT: ABNORMAL

## 2023-05-11 ENCOUNTER — TELEPHONE (OUTPATIENT)
Dept: INTERNAL MEDICINE | Facility: CLINIC | Age: 88
End: 2023-05-11

## 2023-05-11 NOTE — TELEPHONE ENCOUNTER
Left voice message at mobile number   Contacted Luan   And explained   Patient needs to go to er  He is going to let mom and and sister know

## 2023-05-11 NOTE — TELEPHONE ENCOUNTER
----- Message from Amira Naylor DO sent at 5/11/2023  8:16 AM CDT -----  Patient has  pseudomonas resistant UTI without available P.O. medications and requires her to go to the ER for IV therapy. Please call the patient to let them know they need to go to the ED

## 2023-07-28 ENCOUNTER — PES CALL (OUTPATIENT)
Dept: ADMINISTRATIVE | Facility: CLINIC | Age: 88
End: 2023-07-28

## 2025-01-31 NOTE — PROGRESS NOTES
Social Work - Dementia Care Management:    Met with pt, dtr nakul Segovia and Rell, along with Dr. Rubio during clinic.  Pt resident at [facility], which has Assisted Living but no Memory Care. Pt engages in crossword puzzles and reading; not interested in art activities.  Fmly actively engaged, and involved with pt's care. Doctor discussed ACP issues.   Will send resources/support materials to dtr after confirmation of email address.    
Pt started on heparin gtt  - Vascular surgery team consulted, will cont to f/u recs

## 2025-07-15 NOTE — PROGRESS NOTES
"INTERNAL MEDICINE INITIAL VISIT NOTE      CHIEF COMPLAINT     Chief Complaint   Patient presents with    Establish Care       HPI     Aviva Kaur is a 89 y.o. female with HTN, MICHELLE, nephrolithiasis, hemorrhoids, insomnia, here today to establish care. Transferring care from Dr. Chandana Corona. Accompanied by sons Luan and Rell today.     Reports issues falling and staying asleep. Previously on Xanax for sleep, transitioned to Trazodone for past 8 months.     Involved in a lot of activities at facility - exercises, plays Bingo. Loves to read, does crossword puzzles.     Issues when routine not followed.     Family requesting evaluation of memory.     Past Medical History:  Past Medical History:   Diagnosis Date    Anxiety     Hypertension     Nephrolithiasis        Review of Systems:  Review of Systems   Constitutional: Negative for chills and fever.   HENT: Negative for congestion.    Respiratory: Negative for cough and shortness of breath.    Cardiovascular: Negative for chest pain.   Gastrointestinal: Negative for constipation, nausea and vomiting.   Genitourinary: Negative for hematuria and urgency.   Musculoskeletal: Negative for falls.   Skin: Negative for rash.   Neurological: Negative for dizziness and loss of consciousness.   Psychiatric/Behavioral: Positive for memory loss.       Health Maintenance:   Immunizations:   Influenza - fall 2022  Tdap - next visit  Covid 19 - complete  HPV  Prevnar rec at 65 -   Shingrix rec at 50 - next visit    Cancer Screening:  PAP: n/a  Mammogram:  n/a  Colonoscopy:  n/a  DEXA:  n/a      PHYSICAL EXAM     /70 (BP Location: Left arm, Patient Position: Sitting, BP Method: Medium (Manual))   Pulse 76   Ht 4' 11" (1.499 m)   Wt 49 kg (108 lb 0.4 oz)   SpO2 97%   BMI 21.82 kg/m²     GEN - A+OX4, NAD, hard of hearing  HEENT - PERRL, EOMI, bilateral impacted cerumen  Neck - No thyromegaly or thyroid masses felt.  No cervical lymphadenopathy appreciated.  CV " - RRR, no m/r/g  Chest - CTAB, no wheezing, crackles, or rhonchi  Abd - S/NT/ND/+BS.   Ext - 2+BDP. No C/C/E.  Skin - Normal color and texture, no rash, no skin lesions.    ASSESSMENT/PLAN     Aviva Kaur is a 89 y.o. female with conditions as above.     Encounter to establish care with new doctor  Prior notes/labs/imaging reviewed    MICHELLE (generalized anxiety disorder)  Address further at follow-up; consider trial of low dose TCA for sleep/mood stabilization at follow-up    Primary hypertension  Controlled, continue current regimen    Prediabetes  12/2021 HgbA1C 5.8; diet-controlled    Kidney stones  Asymptomatic    Memory change  Obtain labs; discussed MOCA and referral to Neuropsychology at follow-up if indicated   -     Vitamin B12; Future; Expected date: 06/14/2022  -      VITAMIN B1; Future; Expected date: 06/14/2022  -     Folate; Future; Expected date: 06/14/2022    Insomnia, unspecified type  Trial of Melatonin    Abnormal levels of other serum enzymes   -     VITAMIN B6; Future; Expected date: 06/14/2022    Bilateral impacted cerumen  Ear irrigation today     HM as above    RTC in 2 mo, sooner if needed and depending on labs.    Jennifer Sandoval MD  Department of Internal Medicine - [facility]  08/21/2022     No